# Patient Record
Sex: FEMALE | Race: WHITE | NOT HISPANIC OR LATINO
[De-identification: names, ages, dates, MRNs, and addresses within clinical notes are randomized per-mention and may not be internally consistent; named-entity substitution may affect disease eponyms.]

---

## 2021-05-17 ENCOUNTER — FORM ENCOUNTER (OUTPATIENT)
Age: 76
End: 2021-05-17

## 2021-05-17 VITALS
RESPIRATION RATE: 17 BRPM | TEMPERATURE: 98 F | DIASTOLIC BLOOD PRESSURE: 79 MMHG | WEIGHT: 145.06 LBS | SYSTOLIC BLOOD PRESSURE: 126 MMHG | HEART RATE: 113 BPM | OXYGEN SATURATION: 96 %

## 2021-05-17 LAB
ALBUMIN SERPL ELPH-MCNC: 3.9 G/DL — SIGNIFICANT CHANGE UP (ref 3.3–5)
ALP SERPL-CCNC: 74 U/L — SIGNIFICANT CHANGE UP (ref 40–120)
ALT FLD-CCNC: 55 U/L — HIGH (ref 10–45)
ANION GAP SERPL CALC-SCNC: 23 MMOL/L — HIGH (ref 5–17)
AST SERPL-CCNC: 96 U/L — HIGH (ref 10–40)
BASE EXCESS BLDV CALC-SCNC: -4.3 MMOL/L — LOW (ref -2–3)
BASOPHILS # BLD AUTO: 0.03 K/UL — SIGNIFICANT CHANGE UP (ref 0–0.2)
BASOPHILS NFR BLD AUTO: 0.2 % — SIGNIFICANT CHANGE UP (ref 0–2)
BILIRUB SERPL-MCNC: 0.6 MG/DL — SIGNIFICANT CHANGE UP (ref 0.2–1.2)
BUN SERPL-MCNC: 61 MG/DL — HIGH (ref 7–23)
CA-I SERPL-SCNC: 1.24 MMOL/L — SIGNIFICANT CHANGE UP (ref 1.15–1.33)
CALCIUM SERPL-MCNC: 9.8 MG/DL — SIGNIFICANT CHANGE UP (ref 8.4–10.5)
CHLORIDE SERPL-SCNC: 102 MMOL/L — SIGNIFICANT CHANGE UP (ref 96–108)
CK SERPL-CCNC: 4671 U/L — HIGH (ref 25–170)
CO2 BLDV-SCNC: 22.9 MMOL/L — SIGNIFICANT CHANGE UP (ref 22–26)
CO2 SERPL-SCNC: 18 MMOL/L — LOW (ref 22–31)
CREAT SERPL-MCNC: 1.21 MG/DL — SIGNIFICANT CHANGE UP (ref 0.5–1.3)
EOSINOPHIL # BLD AUTO: 0 K/UL — SIGNIFICANT CHANGE UP (ref 0–0.5)
EOSINOPHIL NFR BLD AUTO: 0 % — SIGNIFICANT CHANGE UP (ref 0–6)
GAS PNL BLDV: 140 MMOL/L — SIGNIFICANT CHANGE UP (ref 136–145)
GAS PNL BLDV: SIGNIFICANT CHANGE UP
GLUCOSE SERPL-MCNC: 203 MG/DL — HIGH (ref 70–99)
HCO3 BLDV-SCNC: 22 MMOL/L — SIGNIFICANT CHANGE UP (ref 22–29)
HCT VFR BLD CALC: 49.7 % — HIGH (ref 34.5–45)
HGB BLD-MCNC: 16.1 G/DL — HIGH (ref 11.5–15.5)
IMM GRANULOCYTES NFR BLD AUTO: 0.5 % — SIGNIFICANT CHANGE UP (ref 0–1.5)
LACTATE SERPL-SCNC: 1 MMOL/L — SIGNIFICANT CHANGE UP (ref 0.5–2)
LYMPHOCYTES # BLD AUTO: 1.21 K/UL — SIGNIFICANT CHANGE UP (ref 1–3.3)
LYMPHOCYTES # BLD AUTO: 8.3 % — LOW (ref 13–44)
MAGNESIUM SERPL-MCNC: 2.3 MG/DL — SIGNIFICANT CHANGE UP (ref 1.6–2.6)
MCHC RBC-ENTMCNC: 30.1 PG — SIGNIFICANT CHANGE UP (ref 27–34)
MCHC RBC-ENTMCNC: 32.4 GM/DL — SIGNIFICANT CHANGE UP (ref 32–36)
MCV RBC AUTO: 92.9 FL — SIGNIFICANT CHANGE UP (ref 80–100)
MONOCYTES # BLD AUTO: 1.4 K/UL — HIGH (ref 0–0.9)
MONOCYTES NFR BLD AUTO: 9.6 % — SIGNIFICANT CHANGE UP (ref 2–14)
NEUTROPHILS # BLD AUTO: 11.88 K/UL — HIGH (ref 1.8–7.4)
NEUTROPHILS NFR BLD AUTO: 81.4 % — HIGH (ref 43–77)
NRBC # BLD: 0 /100 WBCS — SIGNIFICANT CHANGE UP (ref 0–0)
PCO2 BLDV: 42 MMHG — SIGNIFICANT CHANGE UP (ref 39–42)
PH BLDV: 7.32 — SIGNIFICANT CHANGE UP (ref 7.32–7.43)
PLATELET # BLD AUTO: 308 K/UL — SIGNIFICANT CHANGE UP (ref 150–400)
PO2 BLDV: 43 MMHG — SIGNIFICANT CHANGE UP
POTASSIUM BLDV-SCNC: 4.7 MMOL/L — SIGNIFICANT CHANGE UP (ref 3.5–5.1)
POTASSIUM SERPL-MCNC: 4.6 MMOL/L — SIGNIFICANT CHANGE UP (ref 3.5–5.3)
POTASSIUM SERPL-SCNC: 4.6 MMOL/L — SIGNIFICANT CHANGE UP (ref 3.5–5.3)
PROT SERPL-MCNC: 7.7 G/DL — SIGNIFICANT CHANGE UP (ref 6–8.3)
RBC # BLD: 5.35 M/UL — HIGH (ref 3.8–5.2)
RBC # FLD: 13.2 % — SIGNIFICANT CHANGE UP (ref 10.3–14.5)
SAO2 % BLDV: 68.7 % — SIGNIFICANT CHANGE UP
SODIUM SERPL-SCNC: 143 MMOL/L — SIGNIFICANT CHANGE UP (ref 135–145)
WBC # BLD: 14.59 K/UL — HIGH (ref 3.8–10.5)
WBC # FLD AUTO: 14.59 K/UL — HIGH (ref 3.8–10.5)

## 2021-05-17 PROCEDURE — 72125 CT NECK SPINE W/O DYE: CPT | Mod: 26,MA

## 2021-05-17 PROCEDURE — 73590 X-RAY EXAM OF LOWER LEG: CPT | Mod: 26,LT

## 2021-05-17 PROCEDURE — 70450 CT HEAD/BRAIN W/O DYE: CPT | Mod: 26,MA

## 2021-05-17 PROCEDURE — 71260 CT THORAX DX C+: CPT | Mod: 26,MA

## 2021-05-17 PROCEDURE — 73030 X-RAY EXAM OF SHOULDER: CPT | Mod: 26

## 2021-05-17 PROCEDURE — 99285 EMERGENCY DEPT VISIT HI MDM: CPT | Mod: CS,25

## 2021-05-17 PROCEDURE — 73610 X-RAY EXAM OF ANKLE: CPT | Mod: 26,LT

## 2021-05-17 PROCEDURE — 74177 CT ABD & PELVIS W/CONTRAST: CPT | Mod: 26,MA

## 2021-05-17 PROCEDURE — 73030 X-RAY EXAM OF SHOULDER: CPT | Mod: 26,LT

## 2021-05-17 PROCEDURE — 93010 ELECTROCARDIOGRAM REPORT: CPT

## 2021-05-17 RX ORDER — SODIUM CHLORIDE 9 MG/ML
1000 INJECTION INTRAMUSCULAR; INTRAVENOUS; SUBCUTANEOUS ONCE
Refills: 0 | Status: COMPLETED | OUTPATIENT
Start: 2021-05-17 | End: 2021-05-17

## 2021-05-17 RX ADMIN — SODIUM CHLORIDE 1000 MILLILITER(S): 9 INJECTION INTRAMUSCULAR; INTRAVENOUS; SUBCUTANEOUS at 23:09

## 2021-05-17 RX ADMIN — SODIUM CHLORIDE 1000 MILLILITER(S): 9 INJECTION INTRAMUSCULAR; INTRAVENOUS; SUBCUTANEOUS at 21:57

## 2021-05-17 RX ADMIN — SODIUM CHLORIDE 1000 MILLILITER(S): 9 INJECTION INTRAMUSCULAR; INTRAVENOUS; SUBCUTANEOUS at 20:57

## 2021-05-17 NOTE — ED PROVIDER NOTE - CARE PLAN
Principal Discharge DX:	Syncope, unspecified syncope type  Secondary Diagnosis:	Non-traumatic rhabdomyolysis  Secondary Diagnosis:	Dehydration   Principal Discharge DX:	Syncope, unspecified syncope type  Secondary Diagnosis:	Non-traumatic rhabdomyolysis  Secondary Diagnosis:	Dehydration  Secondary Diagnosis:	Ketosis

## 2021-05-17 NOTE — ED ADULT NURSE NOTE - NSIMPLEMENTINTERV_GEN_ALL_ED
Implemented All Fall Risk Interventions:  [place] to call system. Call bell, personal items and telephone within reach. Instruct patient to call for assistance. Room bathroom lighting operational. Non-slip footwear when patient is off stretcher. Physically safe environment: no spills, clutter or unnecessary equipment. Stretcher in lowest position, wheels locked, appropriate side rails in place. Provide visual cue, wrist band, yellow gown, etc. Monitor gait and stability. Monitor for mental status changes and reorient to person, place, and time. Review medications for side effects contributing to fall risk. Reinforce activity limits and safety measures with patient and family.

## 2021-05-17 NOTE — ED ADULT TRIAGE NOTE - CHIEF COMPLAINT QUOTE
pt BIBA s/p fall about 3 days ago. as per son " last time he spoke to pt was Friday" pt endorsed going to the bathroom and then waking up on the floor. denies use of blood thinners. abrasions noted to both elbows.

## 2021-05-17 NOTE — ED PROVIDER NOTE - CLINICAL SUMMARY MEDICAL DECISION MAKING FREE TEXT BOX
Pt presents s/p fall vs syncope at home, unclear by history, on the floor for days, w/ likely rhabdomyolysis (given abrasions, ecchymoses), very dry clinically. There are no EKG changes to suggest ischemia, infarction, or pericarditis. DDx includes but not limited to syncope, mechanical fall, rhabdomyolysis, other toxic / metabolic / electrolyte disturbances, infection, injury, other pathology. Check imaging, labs, UA, IVF, r/o COVID. Anticipate admission

## 2021-05-17 NOTE — CONSULT NOTE ADULT - ATTENDING COMMENTS
El, Bridget [phone]  Ms Gallegos is a 75 y/o female with DM, HTN, hypothyroidism who was found down with incontinence but does not recall details as  to how she end up on the floor.    In the Ed her SBP 120mmhg, RR 14, SO2 95%, she was a/o x3, dehydrated, has elevated hg, bicarb 8, glu 343, AG 23, trop 0.13 ->0,1, B OH butyrate, elevate, urine with ketone.  She was evaluated with the resident, management decisions made, see above for the details, I agree with the A/P.  -Found Down  -DM with AG  -starvation ketosis  -elevated troponin  -rhabdomyolysis  -dehydration  See above for the details.

## 2021-05-17 NOTE — ED PROVIDER NOTE - PHYSICAL EXAMINATION
Constitutional: Well appearing, well nourished, awake, alert, oriented to person, place, time/situation and in no apparent distress.  Head atraumatic, normocephalic. No signs of trauma  ENMT: Airway patent. Very dry MM  Eyes: Clear bilaterally, PERRL, EOMI  Chest: no trauma anterior. 3 posterior thorax w/ abrasions, some ecchymoses R costophrenic angle / flank  Cardiac: Normal rate, regular rhythm.  Heart sounds S1, S2.  No murmurs, rubs or gallops.  Respiratory: Breaths sounds equal and clear b/l. No increased WOB, tachypnea, hypoxia, or accessory mm use. Pt speaks in full sentences.   Gastrointestinal: Abd soft, NT, ND, NABS. No guarding, rebound, or rigidity. No pulsatile abdominal masses. No organomegaly appreciated. no trauma  Musculoskeletal: Pelvis stable. Range of motion is not limited in b/l hips. FROM all joints x 4 ext w/o dec ROM, or pain. L ankle swelling, L shoulder ecchymosis, R hand ecchymosis dorsally. no midline spinal ttp throughout the spine. no cervical spinal ttp. FROM neck w/o midline pain  Neuro: Alert and oriented x 3, face symmetric. Strength 5/5 x 4 ext and symmetric, nml gross motor movement, nml gait. No focal deficits noted.   Skin: Skin normal color for race, warm, dry. some erythema and mild skin breakdown inferior to abdominal pannus, in the intertriginous area, likely 2/2 skin rubbing / fungal. multiple abrasions and superficial skin breakdown to b/l knees, ecchymosis to proximal L tib / fib.   Psych: Alert and oriented to person, place, time/situation. normal mood and affect.

## 2021-05-17 NOTE — ED ADULT NURSE NOTE - OBJECTIVE STATEMENT
Pt arrives s/p fall. Pt states she does not know how she fell or when she fell but remembers waking up on the bathroom ground. Pt son reports he called pt on Friday around 1430 and found her Saturday newspaper outside her door today (Monday). Unknown how long pt was on floor. Pt reports dizziness and weakness. Pt reports she uses a cane to walk. Pt Aox4, denies HA, no vision changes. Pt denies any pain. Pt has abrasions to bilateral elbows and knees and bruise to L shoulder. Pt able to move all extremities, no numbness/tingling, no swelling, no bleeding, no lacerations noted/hematomas. Pt arrives, strong odor of urine noted, pt incontinent of urine, pt changed into clean gown and sheets.

## 2021-05-17 NOTE — ED PROVIDER NOTE - OBJECTIVE STATEMENT
Pt w/ PMHx NIDDM, HTN, HLD, hypothyroidism, no sig PSHx, presents s/p being found at home, on the ground, in the bathroom. Pt reports she went to the bathroom, and ended up on the floor. Pt does not known what happened. Pt was last spoken to on Friday (4 days ago). Pt was unable to be reached yesterday, therefore a building staff member went into the apartment today, finding her on the floor. Pt denies CP, SOB, f/c, URI, GI,  sx. Pt was incontinent of urine. Pt denies specific complaints. Pt lives alone, normally ambulates on her own, performs all ADLs, and only has help at home in terms of housekeeping once weekly. Pt is accompanied in the ED by her nephew, whom lives in [place], who met the pt in the ED. He did not see the pt in her home. No AC use. Denies hx cardiac disease. Pt had COVID19 vaccine.

## 2021-05-17 NOTE — ED PROVIDER NOTE - SHIFT CHANGE DETAILS
Rhabdo, dehydration, fall vs syncope, starvation ketosis vs dka, pending MICU consult for admission. no acute injury on imaging

## 2021-05-17 NOTE — CONSULT NOTE ADULT - SUBJECTIVE AND OBJECTIVE BOX
ICU CONSULT NOTE***INCOMPLETE    Patient is a 76y old  Female who presents with a chief complaint of     76yFemale    PMHx -   PSx -   Meds -   Allergies -   FHx -   Sx -     PHYSICAL EXAM   Vital Signs Last 24 Hrs  T(C): 36.8 (17 May 2021 22:40), Max: 36.8 (17 May 2021 19:56)  T(F): 98.3 (17 May 2021 22:40), Max: 98.3 (17 May 2021 22:40)  HR: 98 (17 May 2021 22:40) (98 - 113)  BP: 136/98 (17 May 2021 22:40) (126/79 - 136/98)  BP(mean): --  RR: 17 (17 May 2021 22:40) (17 - 17)  SpO2: 95% (17 May 2021 22:40) (95% - 96%)    General -   HEENT -   CV -   Resp -   Abdomen -   Extremities -   Skin -       LABS                        16.1   14.59 )-----------( 308      ( 17 May 2021 20:26 )             49.7     05-17    143  |  102  |  61<H>  ----------------------------<  203<H>  4.6   |  18<L>  |  1.21    Ca    9.8      17 May 2021 20:26  Mg     2.3     05-17    TPro  7.7  /  Alb  3.9  /  TBili  0.6  /  DBili  x   /  AST  96<H>  /  ALT  55<H>  /  AlkPhos  74  05-17      Lactate, Blood: 1.0 mmol/L (05-17-21 @ 23:12)        IMAGING   CXR -   EKG -  ICU CONSULT NOTE    This is a 76F with PMHx of T2DM, HTN, HLD, hypothyroidism who presents after being found down at home in her bathroom. Patient has difficulty recalling the events leading up to her fall.    76yFemale    PMHx -   PSx -   Meds -   Allergies -   FHx -   Sx -     PHYSICAL EXAM   Vital Signs Last 24 Hrs  T(C): 36.8 (17 May 2021 22:40), Max: 36.8 (17 May 2021 19:56)  T(F): 98.3 (17 May 2021 22:40), Max: 98.3 (17 May 2021 22:40)  HR: 98 (17 May 2021 22:40) (98 - 113)  BP: 136/98 (17 May 2021 22:40) (126/79 - 136/98)  BP(mean): --  RR: 17 (17 May 2021 22:40) (17 - 17)  SpO2: 95% (17 May 2021 22:40) (95% - 96%)    General -   HEENT -   CV -   Resp -   Abdomen -   Extremities -   Skin -       LABS                        16.1   14.59 )-----------( 308      ( 17 May 2021 20:26 )             49.7     05-17    143  |  102  |  61<H>  ----------------------------<  203<H>  4.6   |  18<L>  |  1.21    Ca    9.8      17 May 2021 20:26  Mg     2.3     05-17    TPro  7.7  /  Alb  3.9  /  TBili  0.6  /  DBili  x   /  AST  96<H>  /  ALT  55<H>  /  AlkPhos  74  05-17      Lactate, Blood: 1.0 mmol/L (05-17-21 @ 23:12)        IMAGING   CXR -   EKG -  ICU CONSULT NOTE    This is a 76F with PMHx of T2DM, HTN, HLD, hypothyroidism who presents after being found down at home in her bathroom. Patient has difficulty recalling the events leading up to her fall. Patient was last known normal 4 days prior to presentation. She was found on floor of bathroom with incontinence and was brought in to the hospital. At baseline, patient is able to perform ADLs but nephew reports that patient is noncompliant with her home medications. In ED, patient was    76yFemale    PMHx -   PSx -   Meds -   Allergies -   FHx -   Sx -     PHYSICAL EXAM   Vital Signs Last 24 Hrs  T(C): 36.8 (17 May 2021 22:40), Max: 36.8 (17 May 2021 19:56)  T(F): 98.3 (17 May 2021 22:40), Max: 98.3 (17 May 2021 22:40)  HR: 98 (17 May 2021 22:40) (98 - 113)  BP: 136/98 (17 May 2021 22:40) (126/79 - 136/98)  BP(mean): --  RR: 17 (17 May 2021 22:40) (17 - 17)  SpO2: 95% (17 May 2021 22:40) (95% - 96%)    General -   HEENT -   CV -   Resp -   Abdomen -   Extremities -   Skin -       LABS                        16.1   14.59 )-----------( 308      ( 17 May 2021 20:26 )             49.7     05-17    143  |  102  |  61<H>  ----------------------------<  203<H>  4.6   |  18<L>  |  1.21    Ca    9.8      17 May 2021 20:26  Mg     2.3     05-17    TPro  7.7  /  Alb  3.9  /  TBili  0.6  /  DBili  x   /  AST  96<H>  /  ALT  55<H>  /  AlkPhos  74  05-17      Lactate, Blood: 1.0 mmol/L (05-17-21 @ 23:12)        IMAGING   CXR -   EKG -  ICU CONSULT NOTE    This is a 76F with PMHx of T2DM, HTN, HLD, hypothyroidism who presents after being found down at home in her bathroom. Patient has difficulty recalling the events leading up to her fall. Patient was last known normal 4 days prior to presentation. She was found on floor of bathroom with incontinence and was brought in to the hospital. At baseline, patient is able to perform ADLs but nephew reports that patient is noncompliant with her home medications. In ED, patient was tachycardic with anion gap metabolic acidosis likely from starvation ketosis. Patient underwent pan-scan which was relatively unremarkable for significant acute pathologies. Patient received IV NS.     PAST MEDICAL & SURGICAL HISTORY:  Diabetes mellitus  Hypertension  Hypothyroidism    Home Medications:  Glimepiride 2mg PO daily  Invokana 300mg daily  Quinapril 40mg daily  Synthroid 125mcg daily  Victoza 1.8mg injectable sq daily  metformin 1g BID  Simvastatin 40mg daily    Allergies: N  FHx -   Sx -     PHYSICAL EXAM   Vital Signs Last 24 Hrs  T(C): 36.8 (17 May 2021 22:40), Max: 36.8 (17 May 2021 19:56)  T(F): 98.3 (17 May 2021 22:40), Max: 98.3 (17 May 2021 22:40)  HR: 98 (17 May 2021 22:40) (98 - 113)  BP: 136/98 (17 May 2021 22:40) (126/79 - 136/98)  BP(mean): --  RR: 17 (17 May 2021 22:40) (17 - 17)  SpO2: 95% (17 May 2021 22:40) (95% - 96%)    General -   HEENT -   CV -   Resp -   Abdomen -   Extremities -   Skin -       LABS                        16.1   14.59 )-----------( 308      ( 17 May 2021 20:26 )             49.7     05-17    143  |  102  |  61<H>  ----------------------------<  203<H>  4.6   |  18<L>  |  1.21    Ca    9.8      17 May 2021 20:26  Mg     2.3     05-17    TPro  7.7  /  Alb  3.9  /  TBili  0.6  /  DBili  x   /  AST  96<H>  /  ALT  55<H>  /  AlkPhos  74  05-17      Lactate, Blood: 1.0 mmol/L (05-17-21 @ 23:12)        IMAGING   CXR -   EKG -  ICU CONSULT NOTE    This is a 76F with PMHx of T2DM, HTN, HLD, hypothyroidism who presents after being found down at home in her bathroom. Patient has difficulty recalling the events leading up to her fall. Patient was last known normal 4 days prior to presentation. She was found on floor of bathroom with incontinence and was brought in to the hospital. At baseline, patient is able to perform ADLs but nephew reports that patient is noncompliant with her home medications. In ED, patient was tachycardic with anion gap metabolic acidosis likely from starvation ketosis. Patient underwent pan-scan which was relatively unremarkable for significant acute pathologies. Patient received IV NS.     PAST MEDICAL & SURGICAL HISTORY:  Diabetes mellitus  Hypertension  Hypothyroidism    Home Medications:  Glimepiride 2mg PO daily  Invokana 300mg daily  Quinapril 40mg daily  Synthroid 125mcg daily  Victoza 1.8mg injectable sq daily  metformin 1g BID  Simvastatin 40mg daily    Allergies: NKDA    Family: Noncontributory    Social: Lives alone, etoh denies, former PPD smoker     PHYSICAL EXAM   Vital Signs Last 24 Hrs  T(C): 36.8 (17 May 2021 22:40), Max: 36.8 (17 May 2021 19:56)  T(F): 98.3 (17 May 2021 22:40), Max: 98.3 (17 May 2021 22:40)  HR: 98 (17 May 2021 22:40) (98 - 113)  BP: 136/98 (17 May 2021 22:40) (126/79 - 136/98)  BP(mean): --  RR: 17 (17 May 2021 22:40) (17 - 17)  SpO2: 95% (17 May 2021 22:40) (95% - 96%)    General -   HEENT -   CV -   Resp -   Abdomen -   Extremities -   Skin -       LABS                        16.1   14.59 )-----------( 308      ( 17 May 2021 20:26 )             49.7     05-17    143  |  102  |  61<H>  ----------------------------<  203<H>  4.6   |  18<L>  |  1.21    Ca    9.8      17 May 2021 20:26  Mg     2.3     05-17    TPro  7.7  /  Alb  3.9  /  TBili  0.6  /  DBili  x   /  AST  96<H>  /  ALT  55<H>  /  AlkPhos  74  05-17      Lactate, Blood: 1.0 mmol/L (05-17-21 @ 23:12)        IMAGING   CXR -   EKG -  ICU CONSULT NOTE    This is a 76F with PMHx of T2DM, HTN, HLD, hypothyroidism who presents after being found down at home in her bathroom. Patient has difficulty recalling the events leading up to her fall. Patient was last known normal 4 days prior to presentation. She was found on floor of bathroom with incontinence and was brought in to the hospital. At baseline, patient is able to perform ADLs but nephew reports that patient is noncompliant with her home medications. In ED, patient was tachycardic with anion gap metabolic acidosis likely from starvation ketosis and rhabdomyolysis. Patient underwent pan-scan which was relatively unremarkable for significant acute pathologies. Patient received IV NS.     PAST MEDICAL & SURGICAL HISTORY:  Diabetes mellitus  Hypertension  Hypothyroidism    Home Medications:  Glimepiride 2mg PO daily  Invokana 300mg daily  Quinapril 40mg daily  Synthroid 125mcg daily  Victoza 1.8mg injectable sq daily  metformin 1g BID  Simvastatin 40mg daily    Allergies: NKDA    Family: Noncontributory    Social: Lives alone, etoh denies, former PPD smoker     PHYSICAL EXAM   Vital Signs Last 24 Hrs  T(C): 36.8 (17 May 2021 22:40), Max: 36.8 (17 May 2021 19:56)  T(F): 98.3 (17 May 2021 22:40), Max: 98.3 (17 May 2021 22:40)  HR: 98 (17 May 2021 22:40) (98 - 113)  BP: 136/98 (17 May 2021 22:40) (126/79 - 136/98)  BP(mean): --  RR: 17 (17 May 2021 22:40) (17 - 17)  SpO2: 95% (17 May 2021 22:40) (95% - 96%)    PHYSICAL EXAM:    Constitutional: WDWN resting comfortably in bed; NAD  Head: NC/AT  Eyes: PERRL, EOMI, clear conjunctiva  ENT: no nasal discharge; uvula midline, no oropharyngeal erythema or exudates; MMM  Neck: supple; dry mucus membranes  Respiratory: CTA B/L; no W/R/r  Cardiac: +S1/S2; RRR; no M/R/G  Gastrointestinal: soft, NT/ND; suprapubic tenderness; no rebound or guarding; +BSx4  Back: spine midline, no bony tenderness or step-offs  Extremities: WWP, no clubbing or cyanosis; no peripheral edema  Musculoskeletal: NROM x4; no joint swelling, tenderness or erythema  Vascular: 2+ radial and pedal pulses b/l  Dermatologic: skin warm, dry and intact; no rashes, wounds, or scars  Neurologic: AAOx3; moves all extremities    LABS                        16.1   14.59 )-----------( 308      ( 17 May 2021 20:26 )             49.7     05-17    143  |  102  |  61<H>  ----------------------------<  203<H>  4.6   |  18<L>  |  1.21    Ca    9.8      17 May 2021 20:26  Mg     2.3     05-17    TPro  7.7  /  Alb  3.9  /  TBili  0.6  /  DBili  x   /  AST  96<H>  /  ALT  55<H>  /  AlkPhos  74  05-17    Lactate, Blood: 1.0 mmol/L (05-17-21 @ 23:12)    IMAGING   CXR - Clear  EKG - Sinus tachycardia with PVC

## 2021-05-17 NOTE — CONSULT NOTE ADULT - ASSESSMENT
This is a 76F with PMHx of T2DM, HTN, HLD, hypothyroidism who presents after being found down at home in her bathroom. Patient has difficulty recalling the events leading up to her fall. Patient was last known normal 4 days prior to presentation. ICU consulted for metabolic derangements and syncope.    #Fall  - found down unclear etiology of fall. Most likely vasovagal given metabolic derangements and patient was in bathroom, however r/o seizure, stroke, syncope  - CT head normal  - EKG nonspecific R wave changes  - Consider EEG  - CT pan scan relatively unremarkable  - No evidence of infection, check procalcitonin  - check TSH  - Treatment of metabolic derangements as below    #Rhabdomyolysis  - CK elevated to 4k on admission in setting being found down for unknown period of time  - s/p 2L NS, give additional 1L LR  - Continue 150cc/hr LR for 10 hours    #Anion gap metabolic acidosis 2/2 starvation ketosis  - In setting of being found down  - No acidosis to suggest other cause of AGMA (lactate normal, no acidosis to suggest DKA)  - Fluids as above  - Give thiamine high dose 500 IV x3 days    #Dehydration  - s/p 2L NS in ED  - Give another 1L LR    #T2DM  - SSI, accuchecks  - Hyperglycemic on admission without acidosis    #CARLOS  - Cr elevated to 1.2 on admission baseline normal likely  - Trend Cr after fluids    Dispo: No role for medicine tele or ICU. Plan discussed with attending Dr. Gastelum.

## 2021-05-17 NOTE — ED PROVIDER NOTE - NS ED ROS FT
Constitutional: No fever or chills.   Eyes: No pain, blurry vision, or discharge.  ENMT: No hearing changes, pain, discharge or infections. No neck pain or stiffness.  Cardiac: No chest pain, SOB or edema. No chest pain with exertion.  Respiratory: No cough or respiratory distress. No hemoptysis. No history of asthma or RAD.  GI: No nausea, vomiting, diarrhea or abdominal pain.  : No dysuria, frequency or burning.  MS: No myalgia, muscle weakness, joint pain or back pain.  Neuro: No headache or weakness. No LOC.  Skin: No skin rash.   Endocrine: see HPI  Except as documented in the HPI, all other systems are negative.

## 2021-05-18 ENCOUNTER — INPATIENT (INPATIENT)
Facility: HOSPITAL | Age: 76
LOS: 2 days | Discharge: HOME HEALTH | DRG: 261 | End: 2021-05-21
Attending: INTERNAL MEDICINE | Admitting: INTERNAL MEDICINE
Payer: MEDICARE

## 2021-05-18 DIAGNOSIS — M62.82 RHABDOMYOLYSIS: ICD-10-CM

## 2021-05-18 DIAGNOSIS — E86.0 DEHYDRATION: ICD-10-CM

## 2021-05-18 DIAGNOSIS — I10 ESSENTIAL (PRIMARY) HYPERTENSION: ICD-10-CM

## 2021-05-18 DIAGNOSIS — E03.9 HYPOTHYROIDISM, UNSPECIFIED: ICD-10-CM

## 2021-05-18 DIAGNOSIS — E11.9 TYPE 2 DIABETES MELLITUS WITHOUT COMPLICATIONS: ICD-10-CM

## 2021-05-18 DIAGNOSIS — R56.9 UNSPECIFIED CONVULSIONS: ICD-10-CM

## 2021-05-18 DIAGNOSIS — R55 SYNCOPE AND COLLAPSE: ICD-10-CM

## 2021-05-18 DIAGNOSIS — E78.5 HYPERLIPIDEMIA, UNSPECIFIED: ICD-10-CM

## 2021-05-18 LAB
A1C WITH ESTIMATED AVERAGE GLUCOSE RESULT: 6 % — HIGH (ref 4–5.6)
ALBUMIN SERPL ELPH-MCNC: 3.1 G/DL — LOW (ref 3.3–5)
ALBUMIN SERPL ELPH-MCNC: 3.4 G/DL — SIGNIFICANT CHANGE UP (ref 3.3–5)
ALP SERPL-CCNC: 59 U/L — SIGNIFICANT CHANGE UP (ref 40–120)
ALP SERPL-CCNC: 66 U/L — SIGNIFICANT CHANGE UP (ref 40–120)
ALT FLD-CCNC: 46 U/L — HIGH (ref 10–45)
ALT FLD-CCNC: 48 U/L — HIGH (ref 10–45)
ANION GAP SERPL CALC-SCNC: 11 MMOL/L — SIGNIFICANT CHANGE UP (ref 5–17)
ANION GAP SERPL CALC-SCNC: 16 MMOL/L — SIGNIFICANT CHANGE UP (ref 5–17)
ANION GAP SERPL CALC-SCNC: 18 MMOL/L — HIGH (ref 5–17)
APPEARANCE UR: CLEAR — SIGNIFICANT CHANGE UP
APTT BLD: 26 SEC — LOW (ref 27.5–35.5)
AST SERPL-CCNC: 69 U/L — HIGH (ref 10–40)
AST SERPL-CCNC: 85 U/L — HIGH (ref 10–40)
B-OH-BUTYR SERPL-SCNC: 3.6 MMOL/L — HIGH
BACTERIA # UR AUTO: PRESENT /HPF
BASOPHILS # BLD AUTO: 0.03 K/UL — SIGNIFICANT CHANGE UP (ref 0–0.2)
BASOPHILS NFR BLD AUTO: 0.3 % — SIGNIFICANT CHANGE UP (ref 0–2)
BILIRUB SERPL-MCNC: 0.4 MG/DL — SIGNIFICANT CHANGE UP (ref 0.2–1.2)
BILIRUB SERPL-MCNC: 0.5 MG/DL — SIGNIFICANT CHANGE UP (ref 0.2–1.2)
BILIRUB UR-MCNC: ABNORMAL
BUN SERPL-MCNC: 41 MG/DL — HIGH (ref 7–23)
BUN SERPL-MCNC: 49 MG/DL — HIGH (ref 7–23)
BUN SERPL-MCNC: 55 MG/DL — HIGH (ref 7–23)
CALCIUM SERPL-MCNC: 8.8 MG/DL — SIGNIFICANT CHANGE UP (ref 8.4–10.5)
CALCIUM SERPL-MCNC: 8.9 MG/DL — SIGNIFICANT CHANGE UP (ref 8.4–10.5)
CALCIUM SERPL-MCNC: 9.1 MG/DL — SIGNIFICANT CHANGE UP (ref 8.4–10.5)
CHLORIDE SERPL-SCNC: 105 MMOL/L — SIGNIFICANT CHANGE UP (ref 96–108)
CHLORIDE SERPL-SCNC: 109 MMOL/L — HIGH (ref 96–108)
CHLORIDE SERPL-SCNC: 109 MMOL/L — HIGH (ref 96–108)
CHOLEST SERPL-MCNC: 160 MG/DL — SIGNIFICANT CHANGE UP
CK MB CFR SERPL CALC: 13.1 NG/ML — HIGH (ref 0–6.7)
CK MB CFR SERPL CALC: 18.5 NG/ML — HIGH (ref 0–6.7)
CK SERPL-CCNC: 2851 U/L — HIGH (ref 25–170)
CK SERPL-CCNC: 3787 U/L — HIGH (ref 25–170)
CO2 SERPL-SCNC: 18 MMOL/L — LOW (ref 22–31)
CO2 SERPL-SCNC: 20 MMOL/L — LOW (ref 22–31)
CO2 SERPL-SCNC: 24 MMOL/L — SIGNIFICANT CHANGE UP (ref 22–31)
COLOR SPEC: YELLOW — SIGNIFICANT CHANGE UP
CREAT SERPL-MCNC: 0.83 MG/DL — SIGNIFICANT CHANGE UP (ref 0.5–1.3)
CREAT SERPL-MCNC: 0.9 MG/DL — SIGNIFICANT CHANGE UP (ref 0.5–1.3)
CREAT SERPL-MCNC: 1.03 MG/DL — SIGNIFICANT CHANGE UP (ref 0.5–1.3)
D DIMER BLD IA.RAPID-MCNC: 362 NG/ML DDU — HIGH
DIFF PNL FLD: ABNORMAL
EOSINOPHIL # BLD AUTO: 0.01 K/UL — SIGNIFICANT CHANGE UP (ref 0–0.5)
EOSINOPHIL NFR BLD AUTO: 0.1 % — SIGNIFICANT CHANGE UP (ref 0–6)
EPI CELLS # UR: SIGNIFICANT CHANGE UP /HPF (ref 0–5)
ESTIMATED AVERAGE GLUCOSE: 126 MG/DL — HIGH (ref 68–114)
GLUCOSE BLDC GLUCOMTR-MCNC: 150 MG/DL — HIGH (ref 70–99)
GLUCOSE BLDC GLUCOMTR-MCNC: 161 MG/DL — HIGH (ref 70–99)
GLUCOSE BLDC GLUCOMTR-MCNC: 183 MG/DL — HIGH (ref 70–99)
GLUCOSE BLDC GLUCOMTR-MCNC: 242 MG/DL — HIGH (ref 70–99)
GLUCOSE SERPL-MCNC: 146 MG/DL — HIGH (ref 70–99)
GLUCOSE SERPL-MCNC: 154 MG/DL — HIGH (ref 70–99)
GLUCOSE SERPL-MCNC: 202 MG/DL — HIGH (ref 70–99)
GLUCOSE UR QL: 500
HCT VFR BLD CALC: 44.1 % — SIGNIFICANT CHANGE UP (ref 34.5–45)
HDLC SERPL-MCNC: 41 MG/DL — LOW
HGB BLD-MCNC: 14.5 G/DL — SIGNIFICANT CHANGE UP (ref 11.5–15.5)
IMM GRANULOCYTES NFR BLD AUTO: 0.3 % — SIGNIFICANT CHANGE UP (ref 0–1.5)
KETONES UR-MCNC: 40 MG/DL
LEUKOCYTE ESTERASE UR-ACNC: NEGATIVE — SIGNIFICANT CHANGE UP
LIPID PNL WITH DIRECT LDL SERPL: 87 MG/DL — SIGNIFICANT CHANGE UP
LYMPHOCYTES # BLD AUTO: 1.49 K/UL — SIGNIFICANT CHANGE UP (ref 1–3.3)
LYMPHOCYTES # BLD AUTO: 12.7 % — LOW (ref 13–44)
MAGNESIUM SERPL-MCNC: 2.2 MG/DL — SIGNIFICANT CHANGE UP (ref 1.6–2.6)
MCHC RBC-ENTMCNC: 30.9 PG — SIGNIFICANT CHANGE UP (ref 27–34)
MCHC RBC-ENTMCNC: 32.9 GM/DL — SIGNIFICANT CHANGE UP (ref 32–36)
MCV RBC AUTO: 93.8 FL — SIGNIFICANT CHANGE UP (ref 80–100)
MONOCYTES # BLD AUTO: 1.14 K/UL — HIGH (ref 0–0.9)
MONOCYTES NFR BLD AUTO: 9.7 % — SIGNIFICANT CHANGE UP (ref 2–14)
NEUTROPHILS # BLD AUTO: 8.99 K/UL — HIGH (ref 1.8–7.4)
NEUTROPHILS NFR BLD AUTO: 76.9 % — SIGNIFICANT CHANGE UP (ref 43–77)
NITRITE UR-MCNC: NEGATIVE — SIGNIFICANT CHANGE UP
NON HDL CHOLESTEROL: 119 MG/DL — SIGNIFICANT CHANGE UP
NRBC # BLD: 0 /100 WBCS — SIGNIFICANT CHANGE UP (ref 0–0)
NT-PROBNP SERPL-SCNC: 2937 PG/ML — HIGH (ref 0–300)
PH UR: 5.5 — SIGNIFICANT CHANGE UP (ref 5–8)
PHOSPHATE SERPL-MCNC: 3.1 MG/DL — SIGNIFICANT CHANGE UP (ref 2.5–4.5)
PLATELET # BLD AUTO: 238 K/UL — SIGNIFICANT CHANGE UP (ref 150–400)
POTASSIUM SERPL-MCNC: 3.7 MMOL/L — SIGNIFICANT CHANGE UP (ref 3.5–5.3)
POTASSIUM SERPL-MCNC: 4 MMOL/L — SIGNIFICANT CHANGE UP (ref 3.5–5.3)
POTASSIUM SERPL-MCNC: 4.3 MMOL/L — SIGNIFICANT CHANGE UP (ref 3.5–5.3)
POTASSIUM SERPL-SCNC: 3.7 MMOL/L — SIGNIFICANT CHANGE UP (ref 3.5–5.3)
POTASSIUM SERPL-SCNC: 4 MMOL/L — SIGNIFICANT CHANGE UP (ref 3.5–5.3)
POTASSIUM SERPL-SCNC: 4.3 MMOL/L — SIGNIFICANT CHANGE UP (ref 3.5–5.3)
PROCALCITONIN SERPL-MCNC: 0.32 NG/ML — HIGH (ref 0.02–0.1)
PROT SERPL-MCNC: 6.4 G/DL — SIGNIFICANT CHANGE UP (ref 6–8.3)
PROT SERPL-MCNC: 6.7 G/DL — SIGNIFICANT CHANGE UP (ref 6–8.3)
PROT UR-MCNC: 30 MG/DL
RBC # BLD: 4.7 M/UL — SIGNIFICANT CHANGE UP (ref 3.8–5.2)
RBC # FLD: 13.3 % — SIGNIFICANT CHANGE UP (ref 10.3–14.5)
RBC CASTS # UR COMP ASSIST: < 5 /HPF — SIGNIFICANT CHANGE UP
SARS-COV-2 RNA SPEC QL NAA+PROBE: SIGNIFICANT CHANGE UP
SODIUM SERPL-SCNC: 143 MMOL/L — SIGNIFICANT CHANGE UP (ref 135–145)
SODIUM SERPL-SCNC: 143 MMOL/L — SIGNIFICANT CHANGE UP (ref 135–145)
SODIUM SERPL-SCNC: 144 MMOL/L — SIGNIFICANT CHANGE UP (ref 135–145)
SP GR SPEC: >=1.03 — SIGNIFICANT CHANGE UP (ref 1–1.03)
T4 AB SER-ACNC: 6.38 UG/DL — SIGNIFICANT CHANGE UP (ref 4.5–11.7)
TRIGL SERPL-MCNC: 159 MG/DL — HIGH
TROPONIN T SERPL-MCNC: 0.09 NG/ML — CRITICAL HIGH (ref 0–0.01)
TROPONIN T SERPL-MCNC: 0.1 NG/ML — CRITICAL HIGH (ref 0–0.01)
TSH SERPL-MCNC: 0.38 UIU/ML — SIGNIFICANT CHANGE UP (ref 0.27–4.2)
URATE SERPL-MCNC: 11.1 MG/DL — HIGH (ref 2.5–7)
UROBILINOGEN FLD QL: 0.2 E.U./DL — SIGNIFICANT CHANGE UP
WBC # BLD: 11.7 K/UL — HIGH (ref 3.8–10.5)
WBC # FLD AUTO: 11.7 K/UL — HIGH (ref 3.8–10.5)
WBC UR QL: < 5 /HPF — SIGNIFICANT CHANGE UP

## 2021-05-18 PROCEDURE — 71045 X-RAY EXAM CHEST 1 VIEW: CPT | Mod: 26

## 2021-05-18 PROCEDURE — 99222 1ST HOSP IP/OBS MODERATE 55: CPT

## 2021-05-18 PROCEDURE — 99223 1ST HOSP IP/OBS HIGH 75: CPT | Mod: GC

## 2021-05-18 PROCEDURE — 93306 TTE W/DOPPLER COMPLETE: CPT | Mod: 26

## 2021-05-18 PROCEDURE — 36000 PLACE NEEDLE IN VEIN: CPT

## 2021-05-18 PROCEDURE — 76937 US GUIDE VASCULAR ACCESS: CPT | Mod: 26

## 2021-05-18 RX ORDER — GLUCAGON INJECTION, SOLUTION 0.5 MG/.1ML
1 INJECTION, SOLUTION SUBCUTANEOUS ONCE
Refills: 0 | Status: DISCONTINUED | OUTPATIENT
Start: 2021-05-18 | End: 2021-05-21

## 2021-05-18 RX ORDER — SODIUM CHLORIDE 9 MG/ML
1000 INJECTION, SOLUTION INTRAVENOUS
Refills: 0 | Status: DISCONTINUED | OUTPATIENT
Start: 2021-05-18 | End: 2021-05-18

## 2021-05-18 RX ORDER — ENOXAPARIN SODIUM 100 MG/ML
40 INJECTION SUBCUTANEOUS EVERY 24 HOURS
Refills: 0 | Status: DISCONTINUED | OUTPATIENT
Start: 2021-05-18 | End: 2021-05-21

## 2021-05-18 RX ORDER — SODIUM CHLORIDE 9 MG/ML
1000 INJECTION, SOLUTION INTRAVENOUS
Refills: 0 | Status: DISCONTINUED | OUTPATIENT
Start: 2021-05-18 | End: 2021-05-21

## 2021-05-18 RX ORDER — NYSTATIN CREAM 100000 [USP'U]/G
1 CREAM TOPICAL
Refills: 0 | Status: DISCONTINUED | OUTPATIENT
Start: 2021-05-18 | End: 2021-05-21

## 2021-05-18 RX ORDER — FUROSEMIDE 40 MG
20 TABLET ORAL ONCE
Refills: 0 | Status: COMPLETED | OUTPATIENT
Start: 2021-05-18 | End: 2021-05-18

## 2021-05-18 RX ORDER — LEVOTHYROXINE SODIUM 125 MCG
125 TABLET ORAL DAILY
Refills: 0 | Status: DISCONTINUED | OUTPATIENT
Start: 2021-05-18 | End: 2021-05-21

## 2021-05-18 RX ORDER — SODIUM CHLORIDE 9 MG/ML
500 INJECTION INTRAMUSCULAR; INTRAVENOUS; SUBCUTANEOUS
Refills: 0 | Status: DISCONTINUED | OUTPATIENT
Start: 2021-05-18 | End: 2021-05-18

## 2021-05-18 RX ORDER — DEXTROSE 50 % IN WATER 50 %
25 SYRINGE (ML) INTRAVENOUS ONCE
Refills: 0 | Status: DISCONTINUED | OUTPATIENT
Start: 2021-05-18 | End: 2021-05-21

## 2021-05-18 RX ORDER — DEXTROSE 50 % IN WATER 50 %
15 SYRINGE (ML) INTRAVENOUS ONCE
Refills: 0 | Status: DISCONTINUED | OUTPATIENT
Start: 2021-05-18 | End: 2021-05-21

## 2021-05-18 RX ORDER — DEXTROSE 50 % IN WATER 50 %
12.5 SYRINGE (ML) INTRAVENOUS ONCE
Refills: 0 | Status: DISCONTINUED | OUTPATIENT
Start: 2021-05-18 | End: 2021-05-21

## 2021-05-18 RX ORDER — SIMVASTATIN 20 MG/1
40 TABLET, FILM COATED ORAL AT BEDTIME
Refills: 0 | Status: DISCONTINUED | OUTPATIENT
Start: 2021-05-18 | End: 2021-05-21

## 2021-05-18 RX ORDER — INSULIN LISPRO 100/ML
VIAL (ML) SUBCUTANEOUS
Refills: 0 | Status: DISCONTINUED | OUTPATIENT
Start: 2021-05-18 | End: 2021-05-21

## 2021-05-18 RX ORDER — AMLODIPINE BESYLATE 2.5 MG/1
5 TABLET ORAL DAILY
Refills: 0 | Status: DISCONTINUED | OUTPATIENT
Start: 2021-05-18 | End: 2021-05-19

## 2021-05-18 RX ADMIN — Medication 4: at 17:02

## 2021-05-18 RX ADMIN — NYSTATIN CREAM 1 APPLICATION(S): 100000 CREAM TOPICAL at 12:06

## 2021-05-18 RX ADMIN — SIMVASTATIN 40 MILLIGRAM(S): 20 TABLET, FILM COATED ORAL at 22:00

## 2021-05-18 RX ADMIN — Medication 2: at 12:21

## 2021-05-18 RX ADMIN — SODIUM CHLORIDE 1000 MILLILITER(S): 9 INJECTION INTRAMUSCULAR; INTRAVENOUS; SUBCUTANEOUS at 00:10

## 2021-05-18 RX ADMIN — NYSTATIN CREAM 1 APPLICATION(S): 100000 CREAM TOPICAL at 22:00

## 2021-05-18 RX ADMIN — SODIUM CHLORIDE 75 MILLILITER(S): 9 INJECTION INTRAMUSCULAR; INTRAVENOUS; SUBCUTANEOUS at 11:58

## 2021-05-18 RX ADMIN — Medication 20 MILLIGRAM(S): at 20:09

## 2021-05-18 RX ADMIN — Medication 2: at 22:00

## 2021-05-18 RX ADMIN — ENOXAPARIN SODIUM 40 MILLIGRAM(S): 100 INJECTION SUBCUTANEOUS at 22:00

## 2021-05-18 RX ADMIN — Medication 125 MICROGRAM(S): at 06:23

## 2021-05-18 RX ADMIN — AMLODIPINE BESYLATE 5 MILLIGRAM(S): 2.5 TABLET ORAL at 22:00

## 2021-05-18 NOTE — CONSULT NOTE ADULT - ASSESSMENT
76F former smoker (quit > 20 years ago), w/ PMHx of NIDDM, HTN, HLD, hypothyroidism BIBEMS s/p being found down on the ground x3days, found to have AGMA 2/2 starvation ketosis and rhabdomyolysis, currently undergoing syncopal workup. Neurology was consulted to evaluate for neurologic causes of syncope. CTH negative for any intracranial pathology. Patient denies headaches, migraines, auras, history of seizures, gait instability, dizziness, lightheadedness. Physical exam shows chronic L-sided weakness. 76F former smoker (quit > 20 years ago), w/ PMHx of NIDDM, HTN, HLD, hypothyroidism BIBEMS s/p being found down on the ground x3days, found to have AGMA 2/2 starvation ketosis and rhabdomyolysis, currently undergoing syncopal workup. Neurology was consulted to evaluate for neurologic causes of syncope. CTH negative for any intracranial pathology. Patient denies headaches, migraines, auras, history of seizures, gait instability, dizziness, lightheadedness. Physical exam shows chronic L-sided weakness. Etiology of syncope to be determined.     Recommendation:  - Please place patient on vEEG x24h  - Please obtain MR Brain without contrast    Recommendations discussed with Dr. Iqbal and primary team.

## 2021-05-18 NOTE — H&P ADULT - HISTORY OF PRESENT ILLNESS
Patient is a 77 y/o Female, w/ PMHx of NIDDM, HTN, HLD, hypothyroidism, who presents s/p being found at home, on the ground, in the bathroom. Pt reports she went to the bathroom, and ended up on the floor. Pt does not known what happened. Pt was last spoken to on Friday (4 days ago). Pt was unable to be reached yesterday, therefore a building staff member went into the apartment today, finding her on the floor. Pt denies CP, SOB, f/c, URI, GI,  sx. Pt was incontinent of urine. Pt denies specific complaints. Pt lives alone, normally ambulates on her own, performs all ADLs, and only has help at home in terms of housekeeping once weekly. Pt is accompanied in the ED by her nephew, whom lives in [place], who met the pt in the ED. He did not see the pt in her home. No AC use. Denies hx cardiac disease. Pt had COVID19 vaccine.    In the ED, VS- T:98.3 F BP: 136/98 mmHg  HR: 98 beats/min RR: 17 breaths/min spO2: 95% on RA   Labs significant for: WBC: 14.59 w/ left shift, Hgb/Hct: 16.1/49.7, CO: 18, BUN/Cr: 61/1.23, glucose: 203, AST/ALT: 96/55, Beta-hydroxy butyrate: 5.0, CK: 4671 ->3787. CKMB: 232 ->181.5, troponin: 0.13->0.10. UA with moderate bilirubin, ketones: 40, protein: 30, + leukocyte esterase. CT Head non-contrast: negative for intracranial abnormality. CT Abdomen/Pelvis: No acute pathology identified. Gallbladder compacted with stones.1.3 cm left renal hypodense lesion measuring 23 Hounsfield units. Recommend follow-up with ultrasound to differentiate cyst versus solid lesion. No acute pathology identified. CT Chest with calcified aorta.     Patient received:   Patient admitted to cardiology/telemetry for further management of syncope and rhabdomyolysis.  Patient is a 75 y/o Female, former smoker (quit > 20 years ago), w/ PMHx of NIDDM, HTN, HLD, hypothyroidism, who presents s/p being found at home, on the ground, in the bathroom. Pt reports she used the bathroom and ended up on the floor without recalling how or events leading prior. Pt was last spoken to on Friday (4 days ago). Pt was unable to be reached yesterday, therefore a building staff member went into the apartment today, finding her on the floor. Pt denies CP, SOB, f/c, URI, GI,  sx. Pt was incontinent of urine. Pt denies specific complaints. Pt lives alone, normally ambulates on her own, performs all ADLs, and only has help at home in terms of housekeeping once weekly. Pt is accompanied in the ED by her nephew, whom lives in [place], who met the pt in the ED. He did not see the pt in her home. No AC use. Denies hx cardiac disease. Pt had COVID19 vaccine.    In the ED, VS- T:98.3 F BP: 136/98 mmHg  HR: 98 beats/min RR: 17 breaths/min spO2: 95% on RA   Labs significant for: WBC: 14.59 w/ left shift, Hgb/Hct: 16.1/49.7, CO: 18, BUN/Cr: 61/1.23, glucose: 203, AST/ALT: 96/55, Beta-hydroxy butyrate: 5.0, CK: 4671 ->3787. CKMB: 232 ->181.5, troponin: 0.13->0.10. UA with moderate bilirubin, ketones: 40, protein: 30, + leukocyte esterase. CT Head non-contrast: negative for intracranial abnormality. CT Abdomen/Pelvis: No acute pathology identified. Gallbladder compacted with stones.1.3 cm left renal hypodense lesion measuring 23 Hounsfield units. Recommend follow-up with ultrasound to differentiate cyst versus solid lesion. No acute pathology identified. CT Chest with calcified aorta.     Patient received: IV NS 1000 cc bolus x 2   Patient admitted to cardiology/telemetry for further management of syncope and rhabdomyolysis.

## 2021-05-18 NOTE — H&P ADULT - ASSESSMENT
Patient is a 77 y/o Female, former smoker (quit > 20 years ago), w/ PMHx of NIDDM, HTN, HLD, hypothyroidism, who presents s/p being found at home, on the ground, in the bathroom. Pt reports she used the bathroom and ended up on the floor without recalling how or events leading prior. Pt was last spoken to on Friday (4 days ago). Pt was unable to be reached yesterday, therefore a building staff member went into the apartment today, finding her on the floor. Patient found to have a CK elevated to 4671 and a troponin of 0.13. Patient admitted to cardiology/telemetry for further management of syncope and rhabdomyolysis.

## 2021-05-18 NOTE — H&P ADULT - PROBLEM SELECTOR PLAN 7
F/u thyroid studies   - Continue home meds:    F: 1/2 NS at 100 cc/hr x 6 hours   E: K>4, Mg>2  N: Consistent Carb, DASH TLC  Dvt: lovenox SC  Dispo: pending clinical progression

## 2021-05-18 NOTE — H&P ADULT - PROBLEM SELECTOR PLAN 3
Patient severely dehydrated on admission   - Continue to measure labs as above  - continue IV fluids

## 2021-05-18 NOTE — CONSULT NOTE ADULT - ASSESSMENT
75 y/o Female, former smoker (quit > 20 years ago), w/ PMHx of NIDDM, HTN, HLD, hypothyroidism, who was admitted to [facility] for evaluation of unwitnessed syncopal episode  Will follow along, monitor telemetry for any cardiac arrhthymias would recommend implantation of ILR for long term cardiac monitoring before discharge,  77 y/o Female, former smoker (quit > 20 years ago), w/ PMHx of NIDDM, HTN, HLD, hypothyroidism, who was admitted to [facility] for evaluation of unwitnessed syncopal episode  Will follow along, monitor telemetry for any cardiac arrhthymias. Please repeat ECG, if patient remains tachycardic may consider initiation of BB for atrial tachycardia,  would recommend implantation of ILR for long term cardiac monitoring before discharge,

## 2021-05-18 NOTE — H&P ADULT - PROBLEM SELECTOR PLAN 6
F/u thyroid studies   - Continue home meds:    F: 1/2 NS at 75 cc/hr x 8 hours   E: K>4, Mg>2  N: Consistent Carb   Dvt: heparin  Dispo: pending clinical progression F/u lipid panel  - Continue home Simvastatin 40 mg qhs

## 2021-05-18 NOTE — CONSULT NOTE ADULT - SUBJECTIVE AND OBJECTIVE BOX
NEUROLOGY INITIAL CONSULT NOTE    HPI:  Patient is a 75 y/o Female, former smoker (quit > 20 years ago), w/ PMHx of NIDDM, HTN, HLD, hypothyroidism, who presents s/p being found at home, on the ground, in the bathroom. Pt reports she used the bathroom and ended up on the floor without recalling how or events leading prior. Pt was last spoken to on Friday (4 days ago). Pt was unable to be reached yesterday, therefore a building staff member went into the apartment today, finding her on the floor. Pt denies CP, SOB, f/c, URI, GI,  sx. Pt was incontinent of urine. Pt denies specific complaints. Pt lives alone, normally ambulates on her own, performs all ADLs, and only has help at home in terms of housekeeping once weekly. Pt is accompanied in the ED by her nephew, whom lives in [place], who met the pt in the ED. He did not see the pt in her home. No AC use. Denies hx cardiac disease. Pt had COVID19 vaccine.    In the ED, VS- T:98.3 F BP: 136/98 mmHg  HR: 98 beats/min RR: 17 breaths/min spO2: 95% on RA   Labs significant for: WBC: 14.59 w/ left shift, Hgb/Hct: 16.1/49.7, CO: 18, BUN/Cr: 61/1.23, glucose: 203, AST/ALT: 96/55, Beta-hydroxy butyrate: 5.0, CK: 4671 ->3787. CKMB: 232 ->181.5, troponin: 0.13->0.10. UA with moderate bilirubin, ketones: 40, protein: 30, + leukocyte esterase. CT Head non-contrast: negative for intracranial abnormality. CT Abdomen/Pelvis: No acute pathology identified. Gallbladder compacted with stones.1.3 cm left renal hypodense lesion measuring 23 Hounsfield units. Recommend follow-up with ultrasound to differentiate cyst versus solid lesion. No acute pathology identified. CT Chest with calcified aorta.     Patient received: IV NS 1000 cc bolus x 2   Patient admitted to cardiology/telemetry for further management of syncope and rhabdomyolysis.  (18 May 2021 02:25)      REVIEW OF SYSTEMS:  Constitutional: No fever, weight loss or fatigue  Eyes: No eye pain, visual disturbances, or discharge  ENMT:  No difficulty hearing, tinnitus, vertigo; No sinus or throat pain  Neck: No pain, stiffness or neck swelling  Respiratory: No shortness of breath  Cardiovascular: No chest pain, palpitations, dizziness or leg swelling  Gastrointestinal: No abdominal or epigastric pain. No nausea, vomiting or hematemesis; No diarrhea or constipation. No melena or hematochezia.  Genitourinary: No dysuria, frequency, hematuria or incontinence  Neurological: No headaches, memory loss, loss of strength, numbness or tremors  Skin: No itching, burning, rashes or lesions   Lymph Nodes: No enlarged glands  Endocrine: No heat or cold intolerance; No hair loss  Musculoskeletal: No joint pain or swelling; No muscle, back or extremity pain  Psychiatric: No depression, anxiety, mood swings or difficulty sleeping  Heme/Lymph: No easy bruising or bleeding gums  Allergy and Immunologic: No hives or eczema    PAST MEDICAL & SURGICAL HISTORY:  Diabetes mellitus    Hypertension    Hypothyroidism        FAMILY HISTORY:      SOCIAL HISTORY:  Smoking Status: [ ] Current, [ ] Former, [ ] Never  Pack Years:    MEDICATIONS:  Pulmonary:    Antimicrobials:    Anticoagulants:  enoxaparin Injectable 40 milliGRAM(s) SubCutaneous every 24 hours    Onc:    GI/:    Endocrine:  dextrose 40% Gel 15 Gram(s) Oral once  dextrose 50% Injectable 25 Gram(s) IV Push once  dextrose 50% Injectable 12.5 Gram(s) IV Push once  dextrose 50% Injectable 25 Gram(s) IV Push once  glucagon  Injectable 1 milliGRAM(s) IntraMuscular once  insulin lispro (ADMELOG) corrective regimen sliding scale   SubCutaneous Before meals and at bedtime  levothyroxine 125 MICROGram(s) Oral daily  simvastatin 40 milliGRAM(s) Oral at bedtime    Cardiac:    Other Medications:  dextrose 5%. 1000 milliLiter(s) IV Continuous <Continuous>  dextrose 5%. 1000 milliLiter(s) IV Continuous <Continuous>  nystatin Cream 1 Application(s) Topical two times a day  sodium chloride 0.45%. 1000 milliLiter(s) IV Continuous <Continuous>  sodium chloride 0.9%. 500 milliLiter(s) IV Continuous <Continuous>      Allergies    No Known Allergies    Intolerances        Vital Signs Last 24 Hrs  T(C): 36.2 (18 May 2021 09:00), Max: 37 (18 May 2021 03:05)  T(F): 97.1 (18 May 2021 09:00), Max: 98.6 (18 May 2021 03:05)  HR: 101 (18 May 2021 08:52) (98 - 116)  BP: 158/67 (18 May 2021 06:23) (126/79 - 160/82)  BP(mean): 110 (18 May 2021 02:25) (110 - 110)  RR: 18 (18 May 2021 08:52) (16 - 18)  SpO2: 95% (18 May 2021 08:52) (94% - 96%)    05-18 @ 07:01  -  05-18 @ 11:24  --------------------------------------------------------  IN: 180 mL / OUT: 0 mL / NET: 180 mL          PHYSICAL EXAM:  Constitutional: WDWN  Head: NC/AT  EENT: PERRL, anicteric sclera; oropharynx clear, MMM  Neck: supple, no appreciable JVD  Respiratory: CTA B/L; no W/R/R  Cardiovascular: +S1/S2, RRR  Gastrointestinal: soft, NT/ND; +BSx4  Extremities: WWP; no edema, clubbing or cyanosis  Vascular: 2+ radial, DP/PT pulses B/L  Neurological: AAOx3; no focal deficits    LABS:      CBC Full  -  ( 18 May 2021 05:51 )  WBC Count : 11.70 K/uL  RBC Count : 4.70 M/uL  Hemoglobin : 14.5 g/dL  Hematocrit : 44.1 %  Platelet Count - Automated : 238 K/uL  Mean Cell Volume : 93.8 fl  Mean Cell Hemoglobin : 30.9 pg  Mean Cell Hemoglobin Concentration : 32.9 gm/dL  Auto Neutrophil # : 8.99 K/uL  Auto Lymphocyte # : 1.49 K/uL  Auto Monocyte # : 1.14 K/uL  Auto Eosinophil # : 0.01 K/uL  Auto Basophil # : 0.03 K/uL  Auto Neutrophil % : 76.9 %  Auto Lymphocyte % : 12.7 %  Auto Monocyte % : 9.7 %  Auto Eosinophil % : 0.1 %  Auto Basophil % : 0.3 %    05-18    143  |  109<H>  |  49<H>  ----------------------------<  154<H>  4.0   |  18<L>  |  0.90    Ca    8.9      18 May 2021 05:51  Phos  3.1     05-18  Mg     2.2     05-18    TPro  6.4  /  Alb  3.1<L>  /  TBili  0.4  /  DBili  x   /  AST  69<H>  /  ALT  46<H>  /  AlkPhos  59  05-18    PTT - ( 18 May 2021 05:51 )  PTT:26.0 sec      Urinalysis Basic - ( 18 May 2021 00:17 )    Color: Yellow / Appearance: Clear / SG: >=1.030 / pH: x  Gluc: x / Ketone: 40 mg/dL  / Bili: Moderate / Urobili: 0.2 E.U./dL   Blood: x / Protein: 30 mg/dL / Nitrite: NEGATIVE   Leuk Esterase: NEGATIVE / RBC: < 5 /HPF / WBC < 5 /HPF   Sq Epi: x / Non Sq Epi: 0-5 /HPF / Bacteria: Present /HPF                RADIOLOGY & ADDITIONAL STUDIES: NEUROLOGY INITIAL CONSULT NOTE    HPI:  Patient is a 77 y/o Female, former smoker (quit > 20 years ago), w/ PMHx of NIDDM, HTN, HLD, hypothyroidism, who presents s/p being found at home, on the ground, in the bathroom. Pt reports she used the bathroom and ended up on the floor without recalling how or events leading prior. Pt was last spoken to on Friday (4 days ago). Pt was unable to be reached yesterday, therefore a building staff member went into the apartment today, finding her on the floor. Pt denies CP, SOB, f/c, URI, GI,  sx. Pt was incontinent of urine. Pt denies specific complaints. Pt lives alone, normally ambulates on her own, performs all ADLs, and only has help at home in terms of housekeeping once weekly. Pt is accompanied in the ED by her nephew, who lives in [place], who met the pt in the ED. He did not see the pt in her home. No AC use. Denies hx cardiac disease. Pt had COVID19 vaccine.    In the ED, VS- T:98.3 F BP: 136/98 mmHg  HR: 98 beats/min RR: 17 breaths/min spO2: 95% on RA   Labs significant for: WBC: 14.59 w/ left shift, Hgb/Hct: 16.1/49.7, CO: 18, BUN/Cr: 61/1.23, glucose: 203, AST/ALT: 96/55, Beta-hydroxy butyrate: 5.0, CK: 4671 ->3787. CKMB: 232 ->181.5, troponin: 0.13->0.10. UA with moderate bilirubin, ketones: 40, protein: 30, + leukocyte esterase. CT Head non-contrast: negative for intracranial abnormality. CT Abdomen/Pelvis: No acute pathology identified. Gallbladder compacted with stones.1.3 cm left renal hypodense lesion measuring 23 Hounsfield units. Recommend follow-up with ultrasound to differentiate cyst versus solid lesion. No acute pathology identified. CT Chest with calcified aorta.     Patient received: IV NS 1000 cc bolus x 2   Patient admitted to cardiology/telemetry for further management of syncope and rhabdomyolysis.  (18 May 2021 02:25)      REVIEW OF SYSTEMS:  Constitutional: No fever, weight loss or fatigue  Eyes: No eye pain, visual disturbances, or discharge  ENMT:  No difficulty hearing, tinnitus, vertigo; No sinus or throat pain  Neck: No pain, stiffness or neck swelling  Respiratory: No shortness of breath  Cardiovascular: No chest pain, palpitations, dizziness or leg swelling  Gastrointestinal: No abdominal or epigastric pain. No nausea, vomiting or hematemesis; No diarrhea or constipation. No melena or hematochezia.  Genitourinary: No dysuria, frequency, hematuria or incontinence  Neurological: No headaches, memory loss, loss of strength, numbness or tremors  Skin: No itching, burning, rashes or lesions   Lymph Nodes: No enlarged glands  Endocrine: No heat or cold intolerance; No hair loss  Musculoskeletal: No joint pain or swelling; No muscle, back or extremity pain  Psychiatric: No depression, anxiety, mood swings or difficulty sleeping  Heme/Lymph: No easy bruising or bleeding gums  Allergy and Immunologic: No hives or eczema    PAST MEDICAL & SURGICAL HISTORY:  Diabetes mellitus    Hypertension    Hypothyroidism        FAMILY HISTORY:      SOCIAL HISTORY:  Former PPD smoker, quit 20 yrs. ago, Denies EtOH, denies illicit drug use. Lives alone, independent of all ADLs. Retired     MEDICATIONS:  Pulmonary:    Antimicrobials:    Anticoagulants:  enoxaparin Injectable 40 milliGRAM(s) SubCutaneous every 24 hours    Onc:    GI/:    Endocrine:  dextrose 40% Gel 15 Gram(s) Oral once  dextrose 50% Injectable 25 Gram(s) IV Push once  dextrose 50% Injectable 12.5 Gram(s) IV Push once  dextrose 50% Injectable 25 Gram(s) IV Push once  glucagon  Injectable 1 milliGRAM(s) IntraMuscular once  insulin lispro (ADMELOG) corrective regimen sliding scale   SubCutaneous Before meals and at bedtime  levothyroxine 125 MICROGram(s) Oral daily  simvastatin 40 milliGRAM(s) Oral at bedtime    Cardiac:    Other Medications:  dextrose 5%. 1000 milliLiter(s) IV Continuous <Continuous>  dextrose 5%. 1000 milliLiter(s) IV Continuous <Continuous>  nystatin Cream 1 Application(s) Topical two times a day  sodium chloride 0.45%. 1000 milliLiter(s) IV Continuous <Continuous>  sodium chloride 0.9%. 500 milliLiter(s) IV Continuous <Continuous>      Allergies    No Known Allergies    Intolerances        Vital Signs Last 24 Hrs  T(C): 36.2 (18 May 2021 09:00), Max: 37 (18 May 2021 03:05)  T(F): 97.1 (18 May 2021 09:00), Max: 98.6 (18 May 2021 03:05)  HR: 101 (18 May 2021 08:52) (98 - 116)  BP: 158/67 (18 May 2021 06:23) (126/79 - 160/82)  BP(mean): 110 (18 May 2021 02:25) (110 - 110)  RR: 18 (18 May 2021 08:52) (16 - 18)  SpO2: 95% (18 May 2021 08:52) (94% - 96%)    05-18 @ 07:01  -  05-18 @ 11:24  --------------------------------------------------------  IN: 180 mL / OUT: 0 mL / NET: 180 mL          PHYSICAL EXAM:  Physical exam:  General: No acute distress, awake and alert  Cardiovascular: Regular rate and rhythm, no murmurs, rubs, or gallops. No bruits  Pulmonary: Anterior breath sounds clear bilaterally, no crackles or wheezing. No use of accessory muscles  Extremities: Radial and DP pulses +2, no edema, scrapes and bruises evident on elbows and knees, worse on LLE    Neurologic:  -Mental status: Awake, alert, oriented to person, place, and time. Speech is fluent with intact naming, repetition, and comprehension, no dysarthria. Recent and remote memory intact. Follows commands. Attention/concentration intact. Fund of knowledge appropriate.  -Cranial nerves:   II: Visual fields are full to confrontation.  III, IV, VI: Extraocular movements are intact without nystagmus. Pupils equally round and reactive to light (2.5mm)  V:  Facial sensation V1-V3 equal and intact   VII: Face is symmetric with normal eye closure and smile  VIII: Hearing is bilaterally intact to finger rub  IX, X: Uvula is midline and soft palate rises symmetrically  XI: Head turning and shoulder shrug are intact.  XII: Tongue protrudes midline  Motor: Normal bulk and tone. No pronator drift. Strength R>L (RUE 5/5, LUE 4+/5, RLE 5/5, LLE 3/5)  Rapid alternating movements intact and symmetric  Sensation: Intact to light touch bilaterally. No neglect or extinction on double simultaneous testing.  Coordination: No dysmetria on finger-to-nose and heel-to-shin bilaterally  Reflexes: Downgoing toes bilaterally   Gait: not assessed      LABS:      CBC Full  -  ( 18 May 2021 05:51 )  WBC Count : 11.70 K/uL  RBC Count : 4.70 M/uL  Hemoglobin : 14.5 g/dL  Hematocrit : 44.1 %  Platelet Count - Automated : 238 K/uL  Mean Cell Volume : 93.8 fl  Mean Cell Hemoglobin : 30.9 pg  Mean Cell Hemoglobin Concentration : 32.9 gm/dL  Auto Neutrophil # : 8.99 K/uL  Auto Lymphocyte # : 1.49 K/uL  Auto Monocyte # : 1.14 K/uL  Auto Eosinophil # : 0.01 K/uL  Auto Basophil # : 0.03 K/uL  Auto Neutrophil % : 76.9 %  Auto Lymphocyte % : 12.7 %  Auto Monocyte % : 9.7 %  Auto Eosinophil % : 0.1 %  Auto Basophil % : 0.3 %    05-18    143  |  109<H>  |  49<H>  ----------------------------<  154<H>  4.0   |  18<L>  |  0.90    Ca    8.9      18 May 2021 05:51  Phos  3.1     05-18  Mg     2.2     05-18    TPro  6.4  /  Alb  3.1<L>  /  TBili  0.4  /  DBili  x   /  AST  69<H>  /  ALT  46<H>  /  AlkPhos  59  05-18    PTT - ( 18 May 2021 05:51 )  PTT:26.0 sec      Urinalysis Basic - ( 18 May 2021 00:17 )    Color: Yellow / Appearance: Clear / SG: >=1.030 / pH: x  Gluc: x / Ketone: 40 mg/dL  / Bili: Moderate / Urobili: 0.2 E.U./dL   Blood: x / Protein: 30 mg/dL / Nitrite: NEGATIVE   Leuk Esterase: NEGATIVE / RBC: < 5 /HPF / WBC < 5 /HPF   Sq Epi: x / Non Sq Epi: 0-5 /HPF / Bacteria: Present /HPF                RADIOLOGY & ADDITIONAL STUDIES: NEUROLOGY INITIAL CONSULT NOTE    HPI:  Patient is a 77 y/o Female, former smoker (quit > 20 years ago), w/ PMHx of NIDDM, HTN, HLD, hypothyroidism, who presents s/p being found at home, on the ground, in the bathroom. Pt reports she used the bathroom and ended up on the floor without recalling how or events leading prior. Pt was last spoken to on Friday (4 days ago). Pt was unable to be reached yesterday, therefore a building staff member went into the apartment today, finding her on the floor. Pt denies CP, SOB, f/c, URI, GI,  sx. Pt was incontinent of urine. Pt denies specific complaints. Pt lives alone, normally ambulates on her own, performs all ADLs, and only has help at home in terms of housekeeping once weekly. Pt is accompanied in the ED by her nephew, who lives in [place], who met the pt in the ED. He did not see the pt in her home. No AC use. Denies hx cardiac disease. Pt had COVID19 vaccine.    In the ED, VS- T:98.3 F BP: 136/98 mmHg  HR: 98 beats/min RR: 17 breaths/min spO2: 95% on RA   Labs significant for: WBC: 14.59 w/ left shift, Hgb/Hct: 16.1/49.7, CO: 18, BUN/Cr: 61/1.23, glucose: 203, AST/ALT: 96/55, Beta-hydroxy butyrate: 5.0, CK: 4671 ->3787. CKMB: 232 ->181.5, troponin: 0.13->0.10. UA with moderate bilirubin, ketones: 40, protein: 30, + leukocyte esterase. CT Head non-contrast: negative for intracranial abnormality. CT Abdomen/Pelvis: No acute pathology identified. Gallbladder compacted with stones.1.3 cm left renal hypodense lesion measuring 23 Hounsfield units. Recommend follow-up with ultrasound to differentiate cyst versus solid lesion. No acute pathology identified. CT Chest with calcified aorta.     Patient received: IV NS 1000 cc bolus x 2   Patient admitted to cardiology/telemetry for further management of syncope and rhabdomyolysis.  (18 May 2021 02:25)    Pt doesn't remember the events leading up to her syncopal episode.      REVIEW OF SYSTEMS:  Constitutional: No fever, weight loss or fatigue  Eyes: No eye pain, visual disturbances, or discharge  ENMT:  No difficulty hearing, tinnitus, vertigo; No sinus or throat pain  Neck: No pain, stiffness or neck swelling  Respiratory: No shortness of breath  Cardiovascular: No chest pain, palpitations, dizziness or leg swelling  Gastrointestinal: No abdominal or epigastric pain. No nausea, vomiting or hematemesis; No diarrhea or constipation. No melena or hematochezia.  Genitourinary: No dysuria, frequency, hematuria or incontinence  Neurological: No headaches, memory loss, loss of strength, numbness or tremors  Skin: No itching, burning, rashes or lesions   Lymph Nodes: No enlarged glands  Endocrine: No heat or cold intolerance; No hair loss  Musculoskeletal: No joint pain or swelling; No muscle, back or extremity pain  Psychiatric: No depression, anxiety, mood swings or difficulty sleeping  Heme/Lymph: No easy bruising or bleeding gums  Allergy and Immunologic: No hives or eczema    PAST MEDICAL & SURGICAL HISTORY:  Diabetes mellitus    Hypertension    Hypothyroidism        FAMILY HISTORY:      SOCIAL HISTORY:  Former PPD smoker, quit 20 yrs. ago, Denies EtOH, denies illicit drug use. Lives alone, independent of all ADLs. Retired     MEDICATIONS:  Pulmonary:    Antimicrobials:    Anticoagulants:  enoxaparin Injectable 40 milliGRAM(s) SubCutaneous every 24 hours    Onc:    GI/:    Endocrine:  dextrose 40% Gel 15 Gram(s) Oral once  dextrose 50% Injectable 25 Gram(s) IV Push once  dextrose 50% Injectable 12.5 Gram(s) IV Push once  dextrose 50% Injectable 25 Gram(s) IV Push once  glucagon  Injectable 1 milliGRAM(s) IntraMuscular once  insulin lispro (ADMELOG) corrective regimen sliding scale   SubCutaneous Before meals and at bedtime  levothyroxine 125 MICROGram(s) Oral daily  simvastatin 40 milliGRAM(s) Oral at bedtime    Cardiac:    Other Medications:  dextrose 5%. 1000 milliLiter(s) IV Continuous <Continuous>  dextrose 5%. 1000 milliLiter(s) IV Continuous <Continuous>  nystatin Cream 1 Application(s) Topical two times a day  sodium chloride 0.45%. 1000 milliLiter(s) IV Continuous <Continuous>  sodium chloride 0.9%. 500 milliLiter(s) IV Continuous <Continuous>      Allergies    No Known Allergies    Intolerances        Vital Signs Last 24 Hrs  T(C): 36.2 (18 May 2021 09:00), Max: 37 (18 May 2021 03:05)  T(F): 97.1 (18 May 2021 09:00), Max: 98.6 (18 May 2021 03:05)  HR: 101 (18 May 2021 08:52) (98 - 116)  BP: 158/67 (18 May 2021 06:23) (126/79 - 160/82)  BP(mean): 110 (18 May 2021 02:25) (110 - 110)  RR: 18 (18 May 2021 08:52) (16 - 18)  SpO2: 95% (18 May 2021 08:52) (94% - 96%)    05-18 @ 07:01  -  05-18 @ 11:24  --------------------------------------------------------  IN: 180 mL / OUT: 0 mL / NET: 180 mL          PHYSICAL EXAM:  Physical exam:  General: No acute distress, awake and alert  Cardiovascular: Regular rate and rhythm, no murmurs, rubs, or gallops. No bruits  Pulmonary: Anterior breath sounds clear bilaterally, no crackles or wheezing. No use of accessory muscles  Extremities: Radial and DP pulses +2, no edema, scrapes and bruises evident on elbows and knees, worse on LLE    Neurologic:  -Mental status: Awake, alert, oriented to person, place, and time. Speech is fluent with intact naming, repetition, and comprehension, no dysarthria. Recent and remote memory intact. Follows commands. Attention/concentration intact. Fund of knowledge appropriate.  -Cranial nerves:   II: Visual fields are full to confrontation.  III, IV, VI: Extraocular movements are intact without nystagmus. Pupils equally round and reactive to light (2.5mm)  V:  Facial sensation V1-V3 equal and intact   VII: Face is symmetric with normal eye closure and smile  VIII: Hearing is bilaterally intact to finger rub  IX, X: Uvula is midline and soft palate rises symmetrically  XI: Head turning and shoulder shrug are intact.  XII: Tongue protrudes midline  Motor: Normal bulk and tone. No pronator drift. Strength R>L (RUE 5/5, LUE 4+/5, RLE 5/5, LLE 4/5)  Rapid alternating movements intact and symmetric  Sensation: Intact to light touch bilaterally. No neglect or extinction on double simultaneous testing.  Coordination: No dysmetria on finger-to-nose and heel-to-shin bilaterally  Reflexes: Downgoing toes bilaterally   Gait: not assessed      LABS:      CBC Full  -  ( 18 May 2021 05:51 )  WBC Count : 11.70 K/uL  RBC Count : 4.70 M/uL  Hemoglobin : 14.5 g/dL  Hematocrit : 44.1 %  Platelet Count - Automated : 238 K/uL  Mean Cell Volume : 93.8 fl  Mean Cell Hemoglobin : 30.9 pg  Mean Cell Hemoglobin Concentration : 32.9 gm/dL  Auto Neutrophil # : 8.99 K/uL  Auto Lymphocyte # : 1.49 K/uL  Auto Monocyte # : 1.14 K/uL  Auto Eosinophil # : 0.01 K/uL  Auto Basophil # : 0.03 K/uL  Auto Neutrophil % : 76.9 %  Auto Lymphocyte % : 12.7 %  Auto Monocyte % : 9.7 %  Auto Eosinophil % : 0.1 %  Auto Basophil % : 0.3 %    05-18    143  |  109<H>  |  49<H>  ----------------------------<  154<H>  4.0   |  18<L>  |  0.90    Ca    8.9      18 May 2021 05:51  Phos  3.1     05-18  Mg     2.2     05-18    TPro  6.4  /  Alb  3.1<L>  /  TBili  0.4  /  DBili  x   /  AST  69<H>  /  ALT  46<H>  /  AlkPhos  59  05-18    PTT - ( 18 May 2021 05:51 )  PTT:26.0 sec      Urinalysis Basic - ( 18 May 2021 00:17 )    Color: Yellow / Appearance: Clear / SG: >=1.030 / pH: x  Gluc: x / Ketone: 40 mg/dL  / Bili: Moderate / Urobili: 0.2 E.U./dL   Blood: x / Protein: 30 mg/dL / Nitrite: NEGATIVE   Leuk Esterase: NEGATIVE / RBC: < 5 /HPF / WBC < 5 /HPF   Sq Epi: x / Non Sq Epi: 0-5 /HPF / Bacteria: Present /HPF                RADIOLOGY & ADDITIONAL STUDIES:

## 2021-05-18 NOTE — H&P ADULT - PROBLEM SELECTOR PLAN 4
Hx of diabetes  - F/u a1c  - Continue MISS  - F/u beta hydroxybutate, anion gap in am Hx of diabetes; found with a glucose of >300 and anion gap, likely elevated due to dehydration as patient's glucose improved with IV fluids   - F/u a1c  - Home meds: Invokana 300, Glimeperide 2, Metformin 1000 mg BID   - Continue MISS  - F/u beta hydroxybutate, anion gap in am

## 2021-05-18 NOTE — H&P ADULT - PROBLEM SELECTOR PLAN 1
- Abrupt LOC while in bathroom - patient unclear what happened prior to or when she fell, but was found on the ground after many days.   - Head CT negative for intracranial pathology.   - Troponin: 0.13 -0.010  - EKG: Sinus tachycardia at 108 bpm, poor R wave progression, PVCs   - CT abdomen/pelvis: negative for acute pathology. Lactate negative.   - UA + for ketones, leukocyte esterase, glucose, f/u culture   - Patient found to be severely dehydrated, unclear if dehydration occurred before or after fall   - F/u tsh, procalcitonin, blood cultures, lactate in am, bhb  - tele monitoring and echo ordered   - consider EP or neuro - Abrupt LOC while in bathroom - patient unclear what happened prior to or when she fell, but was found on the ground after about 3 days   - Head CT negative for intracranial pathology. Multiple limb xrays negative for acute fractures.   - Patient with ecchymosis on multiple areas of body.    - Troponin: 0.13 -0.010  - EKG: Sinus tachycardia at 108 bpm, poor R wave progression, PVCs   - CT abdomen/pelvis: negative for acute pathology. Lactate negative.   - UA + for ketones, leukocyte esterase, glucose, f/u urine culture   - Patient found to be severely dehydrated, unclear if dehydration occurred before or after fall   - F/u tsh, procalcitonin, blood cultures, lactate in am, bhb  - tele monitoring and echo ordered   - consider EP or neuro

## 2021-05-18 NOTE — PHYSICAL THERAPY INITIAL EVALUATION ADULT - PERTINENT HX OF CURRENT PROBLEM, REHAB EVAL
Patient is a 75 y/o Female, former smoker (quit > 20 years ago), w/ PMHx of NIDDM, HTN, HLD, hypothyroidism, who presents s/p being found at home, on the ground, in the bathroom. Pt reports she used the bathroom and ended up on the floor without recalling how or events leading prior. Pt was last spoken to on Friday (4 days ago). Pt was unable to be reached yesterday, therefore a building staff member went into the apartment today, finding her on the floor.

## 2021-05-18 NOTE — CONSULT NOTE ADULT - SUBJECTIVE AND OBJECTIVE BOX
Electrophysiology Consult Note:     CHIEF COMPLAINT:  Patient is a 76y old  Female who presents with a chief complaint of Syncope/ Rhabdomyolysis (18 May 2021 11:23)        HISTORY OF PRESENT ILLNESS:   HPI:   77 y/o Female, former smoker (quit > 20 years ago), w/ PMHx of NIDDM, HTN, HLD, hypothyroidism, who presents s/p being found at home, on the ground, in the bathroom. Pt reports she used the bathroom and ended up on the floor without recalling how or events leading prior. Pt was last spoken to on Friday (4 days ago). Pt was unable to be reached yesterday, therefore a building staff member went into the apartment today, finding her on the floor. Pt denies CP, SOB, f/c, URI, GI,  sx. Pt was incontinent of urine. Pt denies specific complaints. Pt lives alone, normally ambulates on her own, performs all ADLs, and only has help at home in terms of housekeeping once weekly. Pt is accompanied in the ED by her nephew, whom lives in [place], who met the pt in the ED. He did not see the pt in her home. No AC use. Denies hx cardiac disease. Pt had COVID19 vaccine.  EPS was called to evaluate.         PAST MEDICAL & SURGICAL HISTORY:  Diabetes mellitus    Hypertension    Hypothyroidism        FAMILY HISTORY:      SOCIAL HISTORY:    [x ] Non-smoker      Allergies    No Known Allergies    Intolerances    	    MEDICATIONS:  MEDICATIONS  (STANDING):  dextrose 40% Gel 15 Gram(s) Oral once  dextrose 5%. 1000 milliLiter(s) (50 mL/Hr) IV Continuous <Continuous>  dextrose 5%. 1000 milliLiter(s) (100 mL/Hr) IV Continuous <Continuous>  dextrose 50% Injectable 25 Gram(s) IV Push once  dextrose 50% Injectable 12.5 Gram(s) IV Push once  dextrose 50% Injectable 25 Gram(s) IV Push once  enoxaparin Injectable 40 milliGRAM(s) SubCutaneous every 24 hours  glucagon  Injectable 1 milliGRAM(s) IntraMuscular once  insulin lispro (ADMELOG) corrective regimen sliding scale   SubCutaneous Before meals and at bedtime  levothyroxine 125 MICROGram(s) Oral daily  nystatin Cream 1 Application(s) Topical two times a day  simvastatin 40 milliGRAM(s) Oral at bedtime  sodium chloride 0.45%. 1000 milliLiter(s) (100 mL/Hr) IV Continuous <Continuous>  sodium chloride 0.9%. 500 milliLiter(s) (75 mL/Hr) IV Continuous <Continuous>    MEDICATIONS  (PRN):        REVIEW OF SYSTEMS:  CONSTITUTIONAL: No fever, weight loss, or fatigue  EYES: No eye pain, visual disturbances, or discharge  ENMT:  No difficulty hearing, tinnitus, vertigo; No sinus or throat pain  NECK: No pain or stiffness  RESPIRATORY: No cough, wheezing, chills or hemoptysis; No Shortness of Breath  CARDIOVASCULAR: No chest pain, palpitations, dizziness, or leg swelling  GASTROINTESTINAL: No abdominal or epigastric pain. No nausea, vomiting, or hematemesis; No diarrhea or constipation.   GENITOURINARY: No dysuria, frequency, hematuria, or incontinence  NEUROLOGICAL: No headaches, memory loss, loss of strength, numbness, or tremors  SKIN: No itching, burning, rashes, or lesions   LYMPH Nodes: No enlarged glands  ENDOCRINE: No heat or cold intolerance; No hair loss        PHYSICAL EXAM:  Vital Signs Last 24 Hrs  T(C): 36.2 (18 May 2021 09:00), Max: 37 (18 May 2021 03:05)  T(F): 97.1 (18 May 2021 09:00), Max: 98.6 (18 May 2021 03:05)  HR: 92 (18 May 2021 12:08) (92 - 116)  BP: 146/64 (18 May 2021 12:08) (126/79 - 160/82)  BP(mean): 110 (18 May 2021 02:25) (110 - 110)  RR: 18 (18 May 2021 12:08) (16 - 18)  SpO2: 96% (18 May 2021 12:08) (94% - 96%)  Daily Height in cm: 154.94 (18 May 2021 04:20)    Daily     Constitutional: NAD	  HEENT:   PERRL, EOMI	  CVS: S1 S2, No JVD, No edema  Pulm: Lungs clear to auscultation	  GI:  Soft, Non-tender, + BS	  Ext: No LE edema  Neurologic: A&O x 3  Skin: No rash or lesion       	  LABS:	                         14.5   11.70 )-----------( 238      ( 18 May 2021 05:51 )             44.1     05-18    143  |  109<H>  |  49<H>  ----------------------------<  154<H>  4.0   |  18<L>  |  0.90    Ca    8.9      18 May 2021 05:51  Phos  3.1     05-18  Mg     2.2     05-18    TPro  6.4  /  Alb  3.1<L>  /  TBili  0.4  /  DBili  x   /  AST  69<H>  /  ALT  46<H>  /  AlkPhos  59  05-18    proBNP: Serum Pro-Brain Natriuretic Peptide: 2937 pg/mL (05-18 @ 05:51)    Lipid Profile:   HgA1c:   TSH: Thyroid Stimulating Hormone, Serum: 0.381 uIU/mL (05-18 @ 05:51)  	      EKG:   Telemetry: sinus rhythm     Echo: < from: TTE Echo Complete w/o Contrast w/ Doppler (05.18.21 @ 10:42) >  1. Mitral annular calcification noted. The mean transvalvular gradient is 5.00 mmHg at a heart rate of 96 bpm. There is trace mitral regurgitation.   2. No significant valvular disease.   3. The right ventricle is normal in size. Right ventricular systolic function is normal.   4. There is asymmetric left ventricular hypertrophy. Left ventricular systolic function is hyperdynamic with a calculated ejection fraction of >75%.   5. No pericardial effusion.   6. No prior echo is available for comparison.

## 2021-05-18 NOTE — PHYSICAL THERAPY INITIAL EVALUATION ADULT - GENERAL OBSERVATIONS, REHAB EVAL
Pt received semi supine in bed +prima fit +tele +hep lock. PT received consent to treat from ANYA Pena. Pt was left as received with call bell in reach, VSS, and in NAD. PT to follow up. FIM gait 3.

## 2021-05-18 NOTE — H&P ADULT - PROBLEM SELECTOR PLAN 5
Hx of HTN, on home meds:  - Continue: Hx of HTN, on home meds: Quinapril 40 mg once daily   - Holding ACE given CARLOS

## 2021-05-18 NOTE — H&P ADULT - PROBLEM SELECTOR PLAN 2
Patient found to be on floor for possibly 4 days s/p fall vs. syncope   - CPK: 4671-> 3787, BUN/CR: 61/1.23, AST/ALT: 96/55, WBC elevated to 14.59, Hgb/Hct: 16.1/49.7  - UA with moderate bilirubin, ketones, and protein   - Patient given NS 1000 ml bolus x 2 in ED  - Trend CPK, CMP, CBC, BHB, anion gap   - continue fluids - 1/2 NS at 75 cc/hr x 6 hours Patient found to be on floor for possibly 4 days s/p fall vs. syncope   - CPK: 4671-> 3787, BUN/CR: 61/1.23, AST/ALT: 96/55, WBC elevated to 14.59, Hgb/Hct: 16.1/49.7  - UA with moderate bilirubin, ketones, and protein   - Patient given NS 1000 ml bolus x 2 in ED  - Trend CPK, CMP, CBC, BHB, anion gap   - continue fluids - 1/2 NS at 100 cc/hr x 6 hours

## 2021-05-18 NOTE — H&P ADULT - RS GEN PE MLT RESP DETAILS PC
airway patent/breath sounds equal/good air movement/respirations non-labored/no rales/no rhonchi/no subcutaneous emphysema

## 2021-05-18 NOTE — CHART NOTE - NSCHARTNOTEFT_GEN_A_CORE
75 y/o Female, former smoker (quit > 20 years ago), w/ PMHx of NIDDM, HTN, HLD, hypothyroidism, who presents s/p being found at home, on the ground, in the bathroom. Pt reports she used the bathroom and ended up on the floor without recalling how or events leading prior. Pt was last spoken to on Friday (4 days ago). Pt was unable to be reached yesterday, therefore a building staff member went into the apartment today, finding her on the floor. Patient found to have a CK elevated to 4671 and a troponin of 0.13. Patient admitted to cardiology/telemetry for further management of syncope and rhabdomyolysis.     CK/CKMB and trop continued to trend down with IVF running. Pt ordered for echocardiogram to r/o valvular and/or wall motion and CCTA to r/o ischemic causes of syncope given risk factors. CTH and CT C/A/P negative for acute changes. 77 y/o Female, former smoker (quit > 20 years ago), w/ PMHx of NIDDM, HTN, HLD, hypothyroidism, who presents s/p being found at home, on the ground, in the bathroom. Pt reports she used the bathroom and ended up on the floor without recalling how or events leading prior. Pt was last spoken to on Friday (4 days ago). Pt was unable to be reached yesterday, therefore a building staff member went into the apartment today, finding her on the floor. Patient found to have a CK elevated to 4671 and a troponin of 0.13. Patient admitted to cardiology/telemetry for further management of syncope and rhabdomyolysis.     CK/CKMB and trop continued to trend down with IVF running. Pt ordered for echocardiogram to r/o valvular and/or wall motion causes of syncope. CCTA ordered to r/o ischemic causes of syncope given risk factors. CTH and CT C/A/P negative for acute changes. Thyroid function WNL. Neurology consulted for neurologic cause of syncope. EP consulted for ? loop recorder vs outpatient cardiac monitor. Pt ordered. 77 y/o Female, former smoker (quit > 20 years ago), w/ PMHx of NIDDM, HTN, HLD, hypothyroidism, who presents s/p being found at home, on the ground, in the bathroom. Pt reports she used the bathroom and ended up on the floor without recalling how or events leading prior. Pt was last spoken to on Friday (4 days ago). Pt was unable to be reached yesterday, therefore a building staff member went into the apartment today, finding her on the floor. Patient found to have a CK elevated to 4671 and a troponin of 0.13. Patient admitted to cardiology/telemetry for further management of syncope and rhabdomyolysis.     CK/CKMB and trop continued to trend down with IVF running. Echocardiogram 5/18: hyperdynamic LVEF 75% w/ asymetrical LVH, no gradient. CCTA ordered to r/o ischemic causes of syncope given risk factors. 18G IV placed by cardio fellow and another one by Evangelist Beck NP. CTH and CT C/A/P negative for acute changes. Thyroid function WNL. Neurology consulted for neurologic cause of syncope. EP consulted with plan for likely loop prior to d/c. PT rec home with home PT. 77 y/o Female, former smoker (quit > 20 years ago), w/ PMHx of NIDDM, HTN, HLD, hypothyroidism, who presents s/p being found at home, on the ground, in the bathroom. Pt reports she used the bathroom and ended up on the floor without recalling how or events leading prior. Pt was last spoken to on Friday (4 days ago). Pt was unable to be reached yesterday, therefore a building staff member went into the apartment today, finding her on the floor. Patient found to have a CK elevated to 4671 and a troponin of 0.13. Patient admitted to cardiology/telemetry for further management of syncope and rhabdomyolysis.     CK/CKMB and trop continued to trend down with IVF running. Echocardiogram 5/18: hyperdynamic LVEF 75% w/ asymetrical LVH, no gradient. CCTA ordered to r/o ischemic causes of syncope given risk factors. 18G IV placed by cardio fellow and another one by Evangelist Beck NP. CTH and CT C/A/P negative for acute changes. Thyroid function WNL. Neurology consulted and rec 24hr v-EEG and MRI brain w/o contrast (ordered). EP consulted with plan for likely loop prior to d/c. PT rec home with home PT. 77 y/o Female, former smoker (quit > 20 years ago), w/ PMHx of NIDDM, HTN, HLD, hypothyroidism, who presents s/p being found at home, on the ground, in the bathroom. Pt reports she used the bathroom and ended up on the floor without recalling how or events leading prior. Pt was last spoken to on Friday (4 days ago). Pt was unable to be reached yesterday, therefore a building staff member went into the apartment today, finding her on the floor. Patient found to have a CK elevated to 4671 and a troponin of 0.13. Patient admitted to cardiology/telemetry for further management of syncope and rhabdomyolysis.     CK/CKMB and trop continued to trend down with IVF running. Echocardiogram 5/18: hyperdynamic LVEF 75% w/ asymetrical LVH, no gradient. CCTA ordered to r/o ischemic causes of syncope given risk factors. 18G IV placed by cardio fellow and another one by Evangelist Beck NP. CTH and CT C/A/P negative for acute changes. Thyroid function WNL. Neurology consulted and rec 24hr v-EEG and MRI brain w/o contrast (ordered). EP consulted with plan for likely loop prior to d/c. PT rec home with home PT.    Update: 17:00 -- per RN, patient noted to be satting 89% RA. On exam, crackles at b/l bases satting 92% RA, HR 108bpm no complaints of SOB. STAT CXR w/ mild pulm vascular congestion, no effusions noted. IVF held and given 20 IV Lasix x1. Unclear how accurate Is and Os are as primafit not connected, however per nursing not urinating frequently. Bladder scan with only 40cc urine. DDimer ordered for 6pm lab draw to r/o DVT/PE w/ continued sinus tach.

## 2021-05-19 LAB
ANION GAP SERPL CALC-SCNC: 12 MMOL/L — SIGNIFICANT CHANGE UP (ref 5–17)
BUN SERPL-MCNC: 39 MG/DL — HIGH (ref 7–23)
CALCIUM SERPL-MCNC: 9 MG/DL — SIGNIFICANT CHANGE UP (ref 8.4–10.5)
CHLORIDE SERPL-SCNC: 110 MMOL/L — HIGH (ref 96–108)
CO2 SERPL-SCNC: 23 MMOL/L — SIGNIFICANT CHANGE UP (ref 22–31)
COVID-19 SPIKE DOMAIN AB INTERP: POSITIVE
COVID-19 SPIKE DOMAIN ANTIBODY RESULT: >250 U/ML — HIGH
CREAT SERPL-MCNC: 0.74 MG/DL — SIGNIFICANT CHANGE UP (ref 0.5–1.3)
CULTURE RESULTS: NO GROWTH — SIGNIFICANT CHANGE UP
GLUCOSE BLDC GLUCOMTR-MCNC: 166 MG/DL — HIGH (ref 70–99)
GLUCOSE BLDC GLUCOMTR-MCNC: 180 MG/DL — HIGH (ref 70–99)
GLUCOSE BLDC GLUCOMTR-MCNC: 181 MG/DL — HIGH (ref 70–99)
GLUCOSE BLDC GLUCOMTR-MCNC: 196 MG/DL — HIGH (ref 70–99)
GLUCOSE SERPL-MCNC: 195 MG/DL — HIGH (ref 70–99)
HCT VFR BLD CALC: 43 % — SIGNIFICANT CHANGE UP (ref 34.5–45)
HGB BLD-MCNC: 13.8 G/DL — SIGNIFICANT CHANGE UP (ref 11.5–15.5)
MAGNESIUM SERPL-MCNC: 2.1 MG/DL — SIGNIFICANT CHANGE UP (ref 1.6–2.6)
MCHC RBC-ENTMCNC: 30.3 PG — SIGNIFICANT CHANGE UP (ref 27–34)
MCHC RBC-ENTMCNC: 32.1 GM/DL — SIGNIFICANT CHANGE UP (ref 32–36)
MCV RBC AUTO: 94.3 FL — SIGNIFICANT CHANGE UP (ref 80–100)
NRBC # BLD: 0 /100 WBCS — SIGNIFICANT CHANGE UP (ref 0–0)
PLATELET # BLD AUTO: 227 K/UL — SIGNIFICANT CHANGE UP (ref 150–400)
POTASSIUM SERPL-MCNC: 3.6 MMOL/L — SIGNIFICANT CHANGE UP (ref 3.5–5.3)
POTASSIUM SERPL-SCNC: 3.6 MMOL/L — SIGNIFICANT CHANGE UP (ref 3.5–5.3)
RBC # BLD: 4.56 M/UL — SIGNIFICANT CHANGE UP (ref 3.8–5.2)
RBC # FLD: 13.3 % — SIGNIFICANT CHANGE UP (ref 10.3–14.5)
SARS-COV-2 IGG+IGM SERPL QL IA: >250 U/ML — HIGH
SARS-COV-2 IGG+IGM SERPL QL IA: POSITIVE
SODIUM SERPL-SCNC: 145 MMOL/L — SIGNIFICANT CHANGE UP (ref 135–145)
SPECIMEN SOURCE: SIGNIFICANT CHANGE UP
WBC # BLD: 8.95 K/UL — SIGNIFICANT CHANGE UP (ref 3.8–10.5)
WBC # FLD AUTO: 8.95 K/UL — SIGNIFICANT CHANGE UP (ref 3.8–10.5)

## 2021-05-19 PROCEDURE — 99232 SBSQ HOSP IP/OBS MODERATE 35: CPT

## 2021-05-19 PROCEDURE — 95720 EEG PHY/QHP EA INCR W/VEEG: CPT

## 2021-05-19 PROCEDURE — 78452 HT MUSCLE IMAGE SPECT MULT: CPT | Mod: 26

## 2021-05-19 PROCEDURE — 93880 EXTRACRANIAL BILAT STUDY: CPT | Mod: 26

## 2021-05-19 PROCEDURE — 93016 CV STRESS TEST SUPVJ ONLY: CPT

## 2021-05-19 PROCEDURE — 93970 EXTREMITY STUDY: CPT | Mod: 26

## 2021-05-19 PROCEDURE — 93018 CV STRESS TEST I&R ONLY: CPT

## 2021-05-19 PROCEDURE — 93010 ELECTROCARDIOGRAM REPORT: CPT

## 2021-05-19 PROCEDURE — 70551 MRI BRAIN STEM W/O DYE: CPT | Mod: 26

## 2021-05-19 RX ORDER — LISINOPRIL 2.5 MG/1
40 TABLET ORAL DAILY
Refills: 0 | Status: DISCONTINUED | OUTPATIENT
Start: 2021-05-19 | End: 2021-05-21

## 2021-05-19 RX ORDER — METOPROLOL TARTRATE 50 MG
25 TABLET ORAL DAILY
Refills: 0 | Status: DISCONTINUED | OUTPATIENT
Start: 2021-05-19 | End: 2021-05-21

## 2021-05-19 RX ORDER — SODIUM CHLORIDE 9 MG/ML
500 INJECTION INTRAMUSCULAR; INTRAVENOUS; SUBCUTANEOUS
Refills: 0 | Status: DISCONTINUED | OUTPATIENT
Start: 2021-05-19 | End: 2021-05-21

## 2021-05-19 RX ORDER — POTASSIUM CHLORIDE 20 MEQ
40 PACKET (EA) ORAL ONCE
Refills: 0 | Status: COMPLETED | OUTPATIENT
Start: 2021-05-19 | End: 2021-05-19

## 2021-05-19 RX ADMIN — Medication 40 MILLIEQUIVALENT(S): at 11:11

## 2021-05-19 RX ADMIN — Medication 2: at 17:48

## 2021-05-19 RX ADMIN — SODIUM CHLORIDE 50 MILLILITER(S): 9 INJECTION INTRAMUSCULAR; INTRAVENOUS; SUBCUTANEOUS at 17:27

## 2021-05-19 RX ADMIN — Medication 125 MICROGRAM(S): at 06:11

## 2021-05-19 RX ADMIN — ENOXAPARIN SODIUM 40 MILLIGRAM(S): 100 INJECTION SUBCUTANEOUS at 22:12

## 2021-05-19 RX ADMIN — SIMVASTATIN 40 MILLIGRAM(S): 20 TABLET, FILM COATED ORAL at 22:12

## 2021-05-19 RX ADMIN — AMLODIPINE BESYLATE 5 MILLIGRAM(S): 2.5 TABLET ORAL at 06:11

## 2021-05-19 RX ADMIN — Medication 2: at 07:51

## 2021-05-19 RX ADMIN — NYSTATIN CREAM 1 APPLICATION(S): 100000 CREAM TOPICAL at 19:17

## 2021-05-19 RX ADMIN — NYSTATIN CREAM 1 APPLICATION(S): 100000 CREAM TOPICAL at 06:11

## 2021-05-19 RX ADMIN — Medication 25 MILLIGRAM(S): at 19:25

## 2021-05-19 NOTE — PROGRESS NOTE ADULT - ASSESSMENT
Patient is a 75 y/o Female, former smoker (quit > 20 years ago), w/ PMHx of NIDDM, HTN, HLD, hypothyroidism, who presents s/p being found at home, on the ground, in the bathroom. Pt reports she used the bathroom and ended up on the floor without recalling how or events leading prior. Pt was last spoken to on Friday (4 days ago). Pt was unable to be reached yesterday, therefore a building staff member went into the apartment today, finding her on the floor. Patient found to have a CK elevated to 4671 and a troponin of 0.13. Patient admitted to cardiology/telemetry for further management of syncope and rhabdomyolysis.          75 y/o Female, former smoker (quit > 20 years ago), w/ PMHx of NIDDM, HTN, HLD, hypothyroidism, who presents s/p being found at home, on the ground, in the bathroom. Pt reports she used the bathroom and ended up on the floor without recalling how or events leading prior. Pt was last spoken to on Friday (4 days ago). Pt was unable to be reached yesterday, therefore a building staff member went into the apartment today, finding her on the floor. Patient found to have a CK elevated to 4671 and a troponin of 0.13. Patient admitted to cardiology/telemetry for further management of syncope and rhabdomyolysis.          75 y/o Female, former smoker (quit > 20 years ago), w/ PMHx of NIDDM, HTN, HLD, hypothyroidism, ?dementia (baseline A & O x2 -confused) who presents after being found at home on the ground after 3 days. Pt does not recall event or prodromal symptoms. On admission, pt noted to have rhabdomyolysis and elevated trop (peak 0.13) s/p IV hydration and plan for pharm NST on 5/19. Neuro following with MRI pending and EEG negative. EP recommending ILR prior to discharge. Pt continues to be sinus tach on tele and actively being ruled out for DVT/PE. PT recommending home with home PT however RN staff requesting re-eval.         75 y/o Female, former smoker (quit > 20 years ago), w/ PMHx of NIDDM, HTN, HLD, hypothyroidism, ?dementia (baseline A & O x2 -confused) who presents after being found at home on the ground after 3 days. Pt does not recall event or prodromal symptoms. On admission, pt noted to have rhabdomyolysis and elevated trop (peak 0.13) s/p IV hydration and NST on 5/19. Neuro following with MRI pending and EEG negative. EP recommending ILR prior to discharge. Pt continues to be sinus tach on tele and actively being ruled out for DVT/PE. PT recommending home with home PT however RN staff requesting re-eval.

## 2021-05-19 NOTE — PROGRESS NOTE ADULT - ASSESSMENT
76F former smoker (quit > 20 years ago), w/ PMHx of NIDDM, HTN, HLD, hypothyroidism BIBEMS s/p being found down on the ground x3days, found to have AGMA 2/2 starvation ketosis and rhabdomyolysis, currently undergoing syncopal workup. Neurology was consulted to evaluate for neurologic causes of syncope. CTH negative for any intracranial pathology. Patient denies headaches, migraines, auras, history of seizures, gait instability, dizziness, lightheadedness. Physical exam shows chronic L-sided weakness. Etiology of syncope to be determined. EEG unremarkable.    Recommendation:    - Please obtain MR Brain without contrast    Recommendations discussed with Dr. Iqbal and primary team.

## 2021-05-19 NOTE — PROGRESS NOTE ADULT - PROBLEM SELECTOR PLAN 7
F/u thyroid studies   - Continue home meds:    F: 1/2 NS at 100 cc/hr x 6 hours   E: K>4, Mg>2  N: Consistent Carb, DASH TLC  Dvt: lovenox SC  Dispo: pending clinical progression TSH WNL  - Continue home levothyroxine 125mcg QD

## 2021-05-19 NOTE — PROGRESS NOTE ADULT - PROBLEM SELECTOR PLAN 1
- Abrupt LOC while in bathroom - patient unclear what happened prior to or when she fell, but was found on the ground after about 3 days   - Head CT negative for intracranial pathology. Multiple limb xrays negative for acute fractures.   - Patient with ecchymosis on multiple areas of body.    - Troponin: 0.13 -0.010  - EKG: Sinus tachycardia at 108 bpm, poor R wave progression, PVCs   - CT abdomen/pelvis: negative for acute pathology. Lactate negative.   - UA + for ketones, leukocyte esterase, glucose, f/u urine culture   - Patient found to be severely dehydrated, unclear if dehydration occurred before or after fall   - F/u tsh, procalcitonin, blood cultures, lactate in am, bhb  - tele monitoring and echo ordered   - consider EP or neuro - Abrupt LOC while in bathroom - patient unclear what happened prior to or when she fell, but was found on the ground after about 3 days   - CTH and CT C/A/P negative UA negative, TSH/BCx negative, ECG non ischemic  - Troponin peak 0.13 (remains CP free) w/ plan for NST on 5/19 - f/u results (CCTA deferred 2/2 sinus tach)  - Echocardiogram: hyperdynamic EF 75% with asymmetric LVH and no valvular disorders   - Neuro consulted: EEG negative, MRI brain w/o contrast ordered (should get prior to ILR)  - EP consulted: plan for ILR prior to discharge  - Carotid US ordered, f/u results  - B/L LE dopplers ordered, f/u results  - PT rec: home with home PT however re-eval requested as RN staff feel she is too unsteady - Abrupt LOC while in bathroom - patient unclear what happened prior to or when she fell, but was found on the ground after about 3 days   - CTH and CT C/A/P negative UA negative, TSH/BCx negative, ECG non ischemic  - Troponin peak 0.13 (remains CP free)   - NST 5/19: small area of mild-moderate ischemia in the inferior wall. Overall left ventricular systolic function is normal without regional wall motion abnormalities. The EKG stress test is normal, NPO after midnigt for possible cath in AM   - Echocardiogram: hyperdynamic EF 75% with asymmetric LVH and no valvular disorders   - Neuro consulted: EEG negative, MRI brain w/o contrast ordered (should get prior to ILR)  - EP consulted: plan for ILR prior to discharge  - Carotid US ordered, f/u results  - B/L LE dopplers ordered, f/u results  - PT rec: home with home PT however re-eval requested as RN staff feel she is too unsteady

## 2021-05-19 NOTE — PROGRESS NOTE ADULT - SUBJECTIVE AND OBJECTIVE BOX
Interventional Cardiology PA Adult Progress Note    Subjective Assessment:  	  MEDICATIONS:  lisinopril 40 milliGRAM(s) Oral daily  metoprolol succinate ER 25 milliGRAM(s) Oral daily            dextrose 40% Gel 15 Gram(s) Oral once  dextrose 50% Injectable 25 Gram(s) IV Push once  dextrose 50% Injectable 12.5 Gram(s) IV Push once  dextrose 50% Injectable 25 Gram(s) IV Push once  glucagon  Injectable 1 milliGRAM(s) IntraMuscular once  insulin lispro (ADMELOG) corrective regimen sliding scale   SubCutaneous Before meals and at bedtime  levothyroxine 125 MICROGram(s) Oral daily  simvastatin 40 milliGRAM(s) Oral at bedtime    dextrose 5%. 1000 milliLiter(s) IV Continuous <Continuous>  dextrose 5%. 1000 milliLiter(s) IV Continuous <Continuous>  enoxaparin Injectable 40 milliGRAM(s) SubCutaneous every 24 hours  nystatin Cream 1 Application(s) Topical two times a day  sodium chloride 0.9%. 500 milliLiter(s) IV Continuous <Continuous>      	    [PHYSICAL EXAM:  TELEMETRY:  T(C): 36.1 (05-19-21 @ 10:00), Max: 37 (05-18-21 @ 21:58)  HR: 91 (05-19-21 @ 11:43) (90 - 120)  BP: 155/70 (05-19-21 @ 11:43) (146/64 - 188/77)  RR: 18 (05-19-21 @ 11:43) (18 - 18)  SpO2: 93% (05-19-21 @ 11:43) (90% - 96%)  Wt(kg): --  I&O's Summary    18 May 2021 07:01  -  19 May 2021 07:00  --------------------------------------------------------  IN: 735 mL / OUT: 952 mL / NET: -217 mL    19 May 2021 07:01  -  19 May 2021 11:55  --------------------------------------------------------  IN: 0 mL / OUT: 0 mL / NET: 0 mL        Sommers:  Central/PICC/Mid Line:                                         Appearance: Normal	  HEENT:   Normal oral mucosa, PERRL, EOMI	  Neck: Supple, + JVD/ - JVD; Carotid Bruit   Cardiovascular: Normal S1 S2, No JVD, No murmurs,   Respiratory: Lungs clear to auscultation/Decreased Breath Sounds/No Rales, Rhonchi, Wheezing	  Gastrointestinal:  Soft, Non-tender, + BS	  Skin: No rashes, No ecchymoses, No cyanosis  Extremities: Normal range of motion, No clubbing, cyanosis or edema  Vascular: Peripheral pulses palpable 2+ bilaterally  Neurologic: Non-focal  Psychiatry: A & O x 3, Mood & affect appropriate      	    ECG:  	  RADIOLOGY:   DIAGNOSTIC TESTING:  [ ] Echocardiogram:  [ ]  Catheterization:  [ ] Stress Test:    [ ] BENY  OTHER: 	    LABS:	 	  CARDIAC MARKERS:                                  13.8   8.95  )-----------( 227      ( 19 May 2021 06:59 )             43.0     05-19    145  |  110<H>  |  39<H>  ----------------------------<  195<H>  3.6   |  23  |  0.74    Ca    9.0      19 May 2021 06:59  Phos  3.1     05-18  Mg     2.1     05-19    TPro  6.4  /  Alb  3.1<L>  /  TBili  0.4  /  DBili  x   /  AST  69<H>  /  ALT  46<H>  /  AlkPhos  59  05-18    proBNP:   Lipid Profile:   HgA1c:   TSH:   PTT - ( 18 May 2021 05:51 )  PTT:26.0 sec    ASSESSMENT/PLAN: 	        DVT ppx:  Dispo:     Interventional Cardiology PA Adult Progress Note    Subjective Assessment: Patient seen and examined at the bedside. No complaints of SOB/CP however stating she would like to go home if she doesn't speak with the doctor. After discussion with Dr. Rubin, patient more willing to stay for evaluation and it was determined patient CANNOT sign out AMA/doesn't have capacity as it appears she has dementia (A & O x2 at baseline per nephew). Nephew (HCP) wants her to stay for evaluation. All other ROS negative except those listed in subjective assessment.   	  MEDICATIONS:  lisinopril 40 milliGRAM(s) Oral daily  metoprolol succinate ER 25 milliGRAM(s) Oral daily  dextrose 40% Gel 15 Gram(s) Oral once  dextrose 50% Injectable 25 Gram(s) IV Push once  dextrose 50% Injectable 12.5 Gram(s) IV Push once  dextrose 50% Injectable 25 Gram(s) IV Push once  glucagon  Injectable 1 milliGRAM(s) IntraMuscular once  insulin lispro (ADMELOG) corrective regimen sliding scale   SubCutaneous Before meals and at bedtime  levothyroxine 125 MICROGram(s) Oral daily  simvastatin 40 milliGRAM(s) Oral at bedtime  dextrose 5%. 1000 milliLiter(s) IV Continuous <Continuous>  dextrose 5%. 1000 milliLiter(s) IV Continuous <Continuous>  enoxaparin Injectable 40 milliGRAM(s) SubCutaneous every 24 hours  nystatin Cream 1 Application(s) Topical two times a day  sodium chloride 0.9%. 500 milliLiter(s) IV Continuous <Continuous>    [PHYSICAL EXAM:  TELEMETRY:  T(C): 36.1 (05-19-21 @ 10:00), Max: 37 (05-18-21 @ 21:58)  HR: 91 (05-19-21 @ 11:43) (90 - 120)  BP: 155/70 (05-19-21 @ 11:43) (146/64 - 188/77)  RR: 18 (05-19-21 @ 11:43) (18 - 18)  SpO2: 93% (05-19-21 @ 11:43) (90% - 96%)  I&O's Summary    18 May 2021 07:01  -  19 May 2021 07:00  --------------------------------------------------------  IN: 735 mL / OUT: 952 mL / NET: -217 mL    19 May 2021 07:01  -  19 May 2021 11:55  --------------------------------------------------------  IN: 0 mL / OUT: 0 mL / NET: 0 mL                               Appearance: Normal		  Neck: Supple,- JVD; no Carotid Bruit b/l  Cardiovascular: Normal S1 S2, No murmurs, rubs, gallops  Respiratory: Lungs clear to auscultation/No Rales, Rhonchi, Wheezing	  Gastrointestinal:  Soft, Non-tender, ND, + BS x4	  Extremities: Normal range of motion, No clubbing, cyanosis. Trace edema b/l   Vascular: Peripheral pulses palpable 2+ bilaterally carotids, radial, femoral, 1+ b/l DP/PT  Neurologic:  A & O x 2 (forgetfull), Mood & affect appropriate  	    ECG:  	  RADIOLOGY:   DIAGNOSTIC TESTING:  [x ] Echocardiogram: < from: TTE Echo Complete w/o Contrast w/ Doppler (05.18.21 @ 10:42) >  CONCLUSIONS:     1. Mitral annular calcification noted. The mean transvalvular gradient is 5.00 mmHg at a heart rate of 96 bpm. There is trace mitral regurgitation.   2. No significant valvular disease.   3. The right ventricle is normal in size. Right ventricular systolic function is normal.   4. There is asymmetric left ventricular hypertrophy. Left ventricular systolic function is hyperdynamic with a calculated ejection fraction of >75%.   5. No pericardial effusion.   6. No prior echo is available for comparison.    < end of copied text >    [ ]  Catheterization:  [ ] Stress Test:    [ ] BENY  OTHER: 	    LABS:	 	  CARDIAC MARKERS:                     13.8   8.95  )-----------( 227      ( 19 May 2021 06:59 )             43.0     05-19    145  |  110<H>  |  39<H>  ----------------------------<  195<H>  3.6   |  23  |  0.74    Ca    9.0      19 May 2021 06:59  Phos  3.1     05-18  Mg     2.1     05-19    TPro  6.4  /  Alb  3.1<L>  /  TBili  0.4  /  DBili  x   /  AST  69<H>  /  ALT  46<H>  /  AlkPhos  59  05-18  PTT - ( 18 May 2021 05:51 )  PTT:26.0 sec

## 2021-05-19 NOTE — EEG REPORT - NS EEG TEXT BOX
[facility] Department of Neurology  Inpatient Continuous video-Electroencephalogram    Acquisition Details:  Electroencephalography was acquired using a minimum of 21 channels on an [employer] system v 8.5.1 with electrode placement according to the standard International 10-20 system following ACNS (American Clinical Neurophysiology Society) guidelines for Long-Term Video EEG monitoring.  Anterior temporal T1 and T2 electrodes were utilized whenever possible.   The [employer]K automated spike & seizure detections were all reviewed in detail, in addition to extensive portions of raw EEG.    Day 1: 5/18/2021 @ 5:38:33 PM to next morning @ 07:00 am  Background:  continuous, with predominantly alpha and beta frequencies.  Symmetry:  No persistent asymmetries of voltage or frequency.  Posterior Dominant Rhythm:  9 Hz symmetric, well-organized, and well-modulated.  Organization: Rudimentary.  Voltage:  Normal (20+ uV)  Variability: Yes. 		Reactivity: Yes.  N2 sleep: Symmetric, synchronous spindles and K complexes.  Spontaneous Activity:  No epileptiform discharges.  Periodic/rhythmic activity:  None.  Events:  No electrographic seizures or significant clinical events.  Provocations:  Hyperventilation and Photic stimulation: was not performed.    Daily Summary:    Mild generalized slowing suggestive of a similar degree of diffuse or multifocal  dysfunction.  No epileptiform activity and no significant clinical events occurred.

## 2021-05-19 NOTE — PROGRESS NOTE ADULT - PROBLEM SELECTOR PLAN 3
Patient severely dehydrated on admission   - Continue to measure labs as above  - continue IV fluids -Continues to clinically appear dehydrated with HR in 90s-100s and subjectively very thirsty   -Per Dr. Rubin, placed back on IVF @ 50cc/hr for hydration   -OF NOTE: on 5/18 PM noted to be crackly on exam and IVF stopped overnight. Received 20mg IV Lasix on 5/18 and now euvolemic.     #Urinary Retention  -On 5/19 noted to be retaining 650cc urine s/p straight cath x1  -F/u repeat bladder scan  -Continue primafit for monitoring

## 2021-05-19 NOTE — PROGRESS NOTE ADULT - PROBLEM SELECTOR PLAN 2
Patient found to be on floor for possibly 4 days s/p fall vs. syncope   - CPK: 4671-> 3787, BUN/CR: 61/1.23, AST/ALT: 96/55, WBC elevated to 14.59, Hgb/Hct: 16.1/49.7  - UA with moderate bilirubin, ketones, and protein   - Patient given NS 1000 ml bolus x 2 in ED  - Trend CPK, CMP, CBC, BHB, anion gap   - continue fluids - 1/2 NS at 100 cc/hr x 6 hours Patient found to be on floor for possibly 4 days s/p fall vs. syncope   - CPK: 4671-> 3787, continues to downtrend  - Patient given NS 1000 ml bolus x 2 in ED and subsequent maintenance IVF on admission

## 2021-05-19 NOTE — PROGRESS NOTE ADULT - PROBLEM SELECTOR PLAN 5
Hx of HTN, on home meds: Quinapril 40 mg once daily   - Holding ACE given CARLOS -SBPs noted to be 170s-180s when holding home Quinapril 40mg QD  -CONT: Lisinopril 40mg QD (therapeutic IC Quinapril) and new Toprol XL 25mg QD

## 2021-05-19 NOTE — PROGRESS NOTE ADULT - PROBLEM SELECTOR PLAN 4
Hx of diabetes; found with a glucose of >300 and anion gap, likely elevated due to dehydration as patient's glucose improved with IV fluids   - F/u a1c  - Home meds: Invokana 300, Glimeperide 2, Metformin 1000 mg BID   - Continue MISS  - F/u beta hydroxybutate, anion gap in am Initially found with a glucose of >300 and anion gap, likely elevated due to dehydration as patient's glucose improved with IV fluids   - A1c 6.0  - Holding home meds: Invokana 300, Glimepiride 2, Metformin 1000 mg BID   - Continue MISS

## 2021-05-20 LAB
ANION GAP SERPL CALC-SCNC: 10 MMOL/L — SIGNIFICANT CHANGE UP (ref 5–17)
BUN SERPL-MCNC: 29 MG/DL — HIGH (ref 7–23)
CALCIUM SERPL-MCNC: 8.4 MG/DL — SIGNIFICANT CHANGE UP (ref 8.4–10.5)
CHLORIDE SERPL-SCNC: 110 MMOL/L — HIGH (ref 96–108)
CO2 SERPL-SCNC: 22 MMOL/L — SIGNIFICANT CHANGE UP (ref 22–31)
CREAT SERPL-MCNC: 0.59 MG/DL — SIGNIFICANT CHANGE UP (ref 0.5–1.3)
GLUCOSE BLDC GLUCOMTR-MCNC: 125 MG/DL — HIGH (ref 70–99)
GLUCOSE BLDC GLUCOMTR-MCNC: 153 MG/DL — HIGH (ref 70–99)
GLUCOSE BLDC GLUCOMTR-MCNC: 167 MG/DL — HIGH (ref 70–99)
GLUCOSE BLDC GLUCOMTR-MCNC: 186 MG/DL — HIGH (ref 70–99)
GLUCOSE SERPL-MCNC: 159 MG/DL — HIGH (ref 70–99)
HCT VFR BLD CALC: 40.6 % — SIGNIFICANT CHANGE UP (ref 34.5–45)
HGB BLD-MCNC: 13.1 G/DL — SIGNIFICANT CHANGE UP (ref 11.5–15.5)
MAGNESIUM SERPL-MCNC: 1.9 MG/DL — SIGNIFICANT CHANGE UP (ref 1.6–2.6)
MCHC RBC-ENTMCNC: 30.4 PG — SIGNIFICANT CHANGE UP (ref 27–34)
MCHC RBC-ENTMCNC: 32.3 GM/DL — SIGNIFICANT CHANGE UP (ref 32–36)
MCV RBC AUTO: 94.2 FL — SIGNIFICANT CHANGE UP (ref 80–100)
NRBC # BLD: 0 /100 WBCS — SIGNIFICANT CHANGE UP (ref 0–0)
PLATELET # BLD AUTO: 179 K/UL — SIGNIFICANT CHANGE UP (ref 150–400)
POTASSIUM SERPL-MCNC: 4 MMOL/L — SIGNIFICANT CHANGE UP (ref 3.5–5.3)
POTASSIUM SERPL-SCNC: 4 MMOL/L — SIGNIFICANT CHANGE UP (ref 3.5–5.3)
RBC # BLD: 4.31 M/UL — SIGNIFICANT CHANGE UP (ref 3.8–5.2)
RBC # FLD: 12.9 % — SIGNIFICANT CHANGE UP (ref 10.3–14.5)
SODIUM SERPL-SCNC: 142 MMOL/L — SIGNIFICANT CHANGE UP (ref 135–145)
WBC # BLD: 6.33 K/UL — SIGNIFICANT CHANGE UP (ref 3.8–10.5)
WBC # FLD AUTO: 6.33 K/UL — SIGNIFICANT CHANGE UP (ref 3.8–10.5)

## 2021-05-20 PROCEDURE — 99232 SBSQ HOSP IP/OBS MODERATE 35: CPT

## 2021-05-20 PROCEDURE — 99152 MOD SED SAME PHYS/QHP 5/>YRS: CPT

## 2021-05-20 PROCEDURE — 93454 CORONARY ARTERY ANGIO S&I: CPT | Mod: 26

## 2021-05-20 RX ORDER — SODIUM CHLORIDE 9 MG/ML
500 INJECTION INTRAMUSCULAR; INTRAVENOUS; SUBCUTANEOUS
Refills: 0 | Status: DISCONTINUED | OUTPATIENT
Start: 2021-05-20 | End: 2021-05-21

## 2021-05-20 RX ORDER — MAGNESIUM OXIDE 400 MG ORAL TABLET 241.3 MG
400 TABLET ORAL ONCE
Refills: 0 | Status: COMPLETED | OUTPATIENT
Start: 2021-05-20 | End: 2021-05-20

## 2021-05-20 RX ORDER — AMLODIPINE BESYLATE 2.5 MG/1
5 TABLET ORAL DAILY
Refills: 0 | Status: DISCONTINUED | OUTPATIENT
Start: 2021-05-20 | End: 2021-05-21

## 2021-05-20 RX ORDER — ASPIRIN/CALCIUM CARB/MAGNESIUM 324 MG
325 TABLET ORAL ONCE
Refills: 0 | Status: COMPLETED | OUTPATIENT
Start: 2021-05-20 | End: 2021-05-20

## 2021-05-20 RX ORDER — CLOPIDOGREL BISULFATE 75 MG/1
600 TABLET, FILM COATED ORAL ONCE
Refills: 0 | Status: COMPLETED | OUTPATIENT
Start: 2021-05-20 | End: 2021-05-20

## 2021-05-20 RX ADMIN — ENOXAPARIN SODIUM 40 MILLIGRAM(S): 100 INJECTION SUBCUTANEOUS at 22:13

## 2021-05-20 RX ADMIN — SODIUM CHLORIDE 75 MILLILITER(S): 9 INJECTION INTRAMUSCULAR; INTRAVENOUS; SUBCUTANEOUS at 12:27

## 2021-05-20 RX ADMIN — LISINOPRIL 40 MILLIGRAM(S): 2.5 TABLET ORAL at 05:15

## 2021-05-20 RX ADMIN — NYSTATIN CREAM 1 APPLICATION(S): 100000 CREAM TOPICAL at 05:15

## 2021-05-20 RX ADMIN — Medication 2: at 07:40

## 2021-05-20 RX ADMIN — AMLODIPINE BESYLATE 5 MILLIGRAM(S): 2.5 TABLET ORAL at 18:33

## 2021-05-20 RX ADMIN — Medication 25 MILLIGRAM(S): at 05:15

## 2021-05-20 RX ADMIN — Medication 325 MILLIGRAM(S): at 12:28

## 2021-05-20 RX ADMIN — NYSTATIN CREAM 1 APPLICATION(S): 100000 CREAM TOPICAL at 18:33

## 2021-05-20 RX ADMIN — Medication 125 MICROGRAM(S): at 05:15

## 2021-05-20 RX ADMIN — CLOPIDOGREL BISULFATE 600 MILLIGRAM(S): 75 TABLET, FILM COATED ORAL at 12:28

## 2021-05-20 RX ADMIN — MAGNESIUM OXIDE 400 MG ORAL TABLET 400 MILLIGRAM(S): 241.3 TABLET ORAL at 12:28

## 2021-05-20 RX ADMIN — Medication 2: at 22:14

## 2021-05-20 RX ADMIN — SIMVASTATIN 40 MILLIGRAM(S): 20 TABLET, FILM COATED ORAL at 22:13

## 2021-05-20 RX ADMIN — Medication 2: at 12:28

## 2021-05-20 NOTE — PROGRESS NOTE ADULT - PROBLEM SELECTOR PLAN 4
Initially found with a glucose of >300 and anion gap, likely elevated due to dehydration as patient's glucose improved with IV fluids   - A1c 6.0  - Holding home meds: Invokana 300, Glimepiride 2, Metformin 1000 mg BID   - Continue MISS A1c 6.0  - Holding home meds: Invokana 300, Glimepiride 2, Metformin 1000 mg BID   - Continue MISS

## 2021-05-20 NOTE — PROGRESS NOTE ADULT - PROBLEM SELECTOR PLAN 3
-Continues to clinically appear dehydrated with HR in 90s-100s and subjectively very thirsty   -Per Dr. Rubin, placed back on IVF @ 50cc/hr for hydration   -OF NOTE: on 5/18 PM noted to be crackly on exam and IVF stopped overnight. Received 20mg IV Lasix on 5/18 and now euvolemic.     #Urinary Retention  -On 5/19 noted to be retaining 650cc urine s/p straight cath x1  -F/u repeat bladder scan  -Continue primafit for monitoring -Continues to clinically appear dehydrated with HR in 90s-100s and subjectively very thirsty   -Maintenance IVF on admission

## 2021-05-20 NOTE — PROGRESS NOTE ADULT - PROBLEM SELECTOR PLAN 1
- Abrupt LOC while in bathroom - patient unclear what happened prior to or when she fell, but was found on the ground after about 3 days   - CTH and CT C/A/P negative UA negative, TSH/BCx negative, ECG non ischemic  - Troponin peak 0.13 (remains CP free)   - NST 5/19: small area of mild-moderate ischemia in the inferior wall. Overall left ventricular systolic function is normal without regional wall motion abnormalities. The EKG stress test is normal, NPO after midnigt for possible cath in AM   - Echocardiogram: hyperdynamic EF 75% with asymmetric LVH and no valvular disorders   - Neuro consulted: EEG negative, MRI brain w/o contrast ordered (should get prior to ILR)  - EP consulted: plan for ILR prior to discharge  - Carotid US ordered, f/u results  - B/L LE dopplers ordered, f/u results  - PT rec: home with home PT however re-eval requested as RN staff feel she is too unsteady - Abrupt LOC while in bathroom - patient unclear what happened prior to or when she fell, but was found on the ground after about 3 days   - CTH and CT C/A/P negative UA negative, TSH/BCx negative, ECG non ischemic  - NST 5/19: small area of mild-moderate ischemia in the inferior wall. NPO for cardiac cath 5/20, f/u results (ASA/Plavix loaded)  - Echocardiogram 5/18: hyperdynamic EF 75% with asymmetric LVH and no valvular disorders   - Neuro consulted: EEG negative, MRI brain w/o contrast negative. Neuro signed off. Should follow up w/ neuro outpatient (attending that was following)  - EP consulted: plan for ILR prior to discharge  - Carotid US negative  - B/L LE dopplers negative   - PT rec: home with home PT, f/u re-eval

## 2021-05-20 NOTE — PROGRESS NOTE ADULT - PROBLEM SELECTOR PLAN 5
-SBPs noted to be 170s-180s when holding home Quinapril 40mg QD  -CONT: Lisinopril 40mg QD (therapeutic IC Quinapril) and new Toprol XL 25mg QD -CONT: Lisinopril 40mg QD (therapeutic IC Quinapril) and new Toprol XL 25mg QD

## 2021-05-20 NOTE — PROGRESS NOTE ADULT - ATTENDING COMMENTS
as above
Syncope/LOC  Non obstructive LHC, No apparent etiology of syncope,   ?Hyperdynamic LV (?dehydration)  -Loop recorder

## 2021-05-20 NOTE — PROGRESS NOTE ADULT - PROBLEM SELECTOR PLAN 6
LDL 81  - CONT: home Simvastatin 40 mg QD LDL 81  - CONT: home Simvastatin 40 mg QD -- consider increasing pending cath results

## 2021-05-20 NOTE — PROGRESS NOTE ADULT - ASSESSMENT
75 y/o Female, former smoker (quit > 20 years ago), w/ PMHx of NIDDM, HTN, HLD, hypothyroidism, ?dementia (baseline A & O x2 -confused) who presents after being found at home on the ground after 3 days. Pt does not recall event or prodromal symptoms. On admission, pt noted to have rhabdomyolysis and elevated trop (peak 0.13) s/p IV hydration and NST on 5/19. Neuro following with MRI pending and EEG negative. EP recommending ILR prior to discharge. Pt continues to be sinus tach on tele and actively being ruled out for DVT/PE. PT recommending home with home PT however RN staff requesting re-eval.         75 y/o Female, former smoker (quit > 20 years ago), w/ PMHx of NIDDM, HTN, HLD, hypothyroidism, ?dementia (baseline A & O x2 - confused) who presents after being found at home on the ground after 3 days. Pt does not recall event or prodromal symptoms. On admission, pt noted to have rhabdomyolysis and elevated trop (peak 0.13) s/p IV hydration with Neuro/EP following. MRI Head/EEG negative, neuro signed off. EP  and w/ NST (+) for inferior ischemia. Neuro following with MRI pending and EEG negative. EP recommending ILR prior to discharge. Pt continues to be sinus tach on tele and actively being ruled out for DVT/PE. PT recommending home with home PT however RN staff requesting re-eval.         77 y/o Female, former smoker (quit > 20 years ago), w/ PMHx of NIDDM, HTN, HLD, hypothyroidism, ?dementia (baseline A & O x2 - confused) who presents after being found at home on the ground after 3 days. Pt does not recall event or prodromal symptoms. On admission, pt noted to have rhabdomyolysis and elevated trop (peak 0.13) s/p IV hydration with Neuro/EP following. MRI Head/EEG negative, neuro signed off. EP plans to place ILR prior to d/c. NST (+) for inferior ischemia. NPO for cardiac cath 5/20 w/ Dr. Rubin. Discharge pending cath/repeat PT eval.

## 2021-05-20 NOTE — PROGRESS NOTE ADULT - ASSESSMENT
76F former smoker (quit > 20 years ago), w/ PMHx of NIDDM, HTN, HLD, hypothyroidism BIBEMS s/p being found down on the ground x3days, found to have AGMA 2/2 starvation ketosis and rhabdomyolysis, currently undergoing syncopal workup. Neurology was consulted to evaluate for neurologic causes of syncope. CTH negative for any intracranial pathology. Patient denies headaches, migraines, auras, history of seizures, gait instability, dizziness, lightheadedness. Physical exam shows chronic L-sided weakness. Etiology of syncope to be determined. EEG unremarkable. Brain MR showing no acute pathology, so syncope is unlikely due to neurological disease    Recommendation:    - No further neurologic workup at this time    Recommendations discussed with Dr. Iqbal and primary team. Neurology will sign off, please re-consult as needed. 76F former smoker (quit > 20 years ago), w/ PMHx of NIDDM, HTN, HLD, hypothyroidism BIBEMS s/p being found down on the ground x3days, found to have AGMA 2/2 starvation ketosis and rhabdomyolysis, currently undergoing syncopal workup. Neurology was consulted to evaluate for neurologic causes of syncope. CTH negative for any intracranial pathology. Patient denies headaches, migraines, auras, history of seizures, gait instability, dizziness, lightheadedness. Physical exam shows chronic L-sided weakness. Etiology of syncope to be determined. EEG unremarkable. Brain MR showing no acute pathology    Recommendation:    - No further neurologic workup at this time    Recommendations discussed with Dr. Iqbal and primary team. Neurology will sign off, please re-consult as needed.

## 2021-05-20 NOTE — PROGRESS NOTE ADULT - TIME BILLING
counseling/coordination of care
Syncope/LOC  Non obstructive LHC, No apparent etiology of syncope,   ?Hyperdynamic LV (?dehydration)  -Loop recorder

## 2021-05-20 NOTE — PROGRESS NOTE ADULT - SUBJECTIVE AND OBJECTIVE BOX
INTERVAL HPI/OVERNIGHT EVENTS:  MR Brain performed with no acute pathology. Some evidence of microvascular disease  SUBJECTIVE: Patient seen and examined at bedside. No acute complaints besides being thirsty and wanting more ice.     VITAL SIGNS:  ICU Vital Signs Last 24 Hrs  T(C): 36.3 (20 May 2021 09:14), Max: 36.8 (19 May 2021 22:38)  T(F): 97.3 (20 May 2021 09:14), Max: 98.3 (19 May 2021 22:38)  HR: 78 (20 May 2021 08:48) (78 - 112)  BP: 159/65 (20 May 2021 08:48) (148/69 - 159/65)  BP(mean): --  ABP: --  ABP(mean): --  RR: 18 (20 May 2021 08:48) (18 - 18)  SpO2: 93% (20 May 2021 08:48) (93% - 98%)        05-19 @ 07:01  -  05-20 @ 07:00  --------------------------------------------------------  IN: 100 mL / OUT: 550 mL / NET: -450 mL      CAPILLARY BLOOD GLUCOSE      POCT Blood Glucose.: 167 mg/dL (20 May 2021 06:47)      PHYSICAL EXAM:    Physical exam:  General: No acute distress, awake and alert, labile mood  Extremities: Radial and DP pulses +2, no edema, scrapes and bruises evident on elbows and knees, worse on LLE    Neurologic:  -Mental status: Awake, alert, oriented to person, place, and time. Speech is fluent with intact naming, repetition, and comprehension, no dysarthria. Recent and remote memory intact. Follows commands. Attention/concentration variable as pt fixated on getting ice. Fund of knowledge appropriate.  -Cranial nerves:   II: Visual fields are full to confrontation.  III, IV, VI: Extraocular movements are intact without nystagmus. Pupils equally round and reactive to light (2.5mm)  V:  Facial sensation V1-V3 equal and intact   VII: Face is symmetric with normal eye closure and smile  VIII: Hearing is bilaterally intact to finger rub  IX, X: Uvula is midline and soft palate rises symmetrically  XI: Head turning and shoulder shrug are intact.  XII: Tongue protrudes midline  Motor: Normal bulk and tone. No pronator drift. Strength R>L (RUE 5/5, LUE 4+/5, RLE 5/5, LLE 4/5)  Rapid alternating movements intact and symmetric  Sensation: Intact to light touch bilaterally. No neglect or extinction on double simultaneous testing.  Coordination: No dysmetria on finger-to-nose and heel-to-shin bilaterally  Reflexes: Downgoing toes bilaterally   Gait: not assessed      MEDICATIONS:  MEDICATIONS  (STANDING):  dextrose 40% Gel 15 Gram(s) Oral once  dextrose 5%. 1000 milliLiter(s) (50 mL/Hr) IV Continuous <Continuous>  dextrose 5%. 1000 milliLiter(s) (100 mL/Hr) IV Continuous <Continuous>  dextrose 50% Injectable 25 Gram(s) IV Push once  dextrose 50% Injectable 12.5 Gram(s) IV Push once  dextrose 50% Injectable 25 Gram(s) IV Push once  enoxaparin Injectable 40 milliGRAM(s) SubCutaneous every 24 hours  glucagon  Injectable 1 milliGRAM(s) IntraMuscular once  insulin lispro (ADMELOG) corrective regimen sliding scale   SubCutaneous Before meals and at bedtime  levothyroxine 125 MICROGram(s) Oral daily  lisinopril 40 milliGRAM(s) Oral daily  magnesium oxide 400 milliGRAM(s) Oral once  metoprolol succinate ER 25 milliGRAM(s) Oral daily  nystatin Cream 1 Application(s) Topical two times a day  simvastatin 40 milliGRAM(s) Oral at bedtime  sodium chloride 0.9%. 500 milliLiter(s) (50 mL/Hr) IV Continuous <Continuous>    MEDICATIONS  (PRN):      ALLERGIES:  Allergies    No Known Allergies    Intolerances        LABS:                        13.1   6.33  )-----------( 179      ( 20 May 2021 07:48 )             40.6     05-20    142  |  110<H>  |  29<H>  ----------------------------<  159<H>  4.0   |  22  |  0.59    Ca    8.4      20 May 2021 07:48  Mg     1.9     05-20            RADIOLOGY & ADDITIONAL TESTS: Reviewed.

## 2021-05-20 NOTE — PROGRESS NOTE ADULT - PROBLEM SELECTOR PLAN 2
Patient found to be on floor for possibly 4 days s/p fall vs. syncope   - CPK: 4671-> 3787, continues to downtrend  - Patient given NS 1000 ml bolus x 2 in ED and subsequent maintenance IVF on admission Patient found to be on floor for possibly 4 days s/p fall vs. syncope -- RESOLVED  -Maintenance IVF on admission

## 2021-05-20 NOTE — PROGRESS NOTE ADULT - SUBJECTIVE AND OBJECTIVE BOX
Interventional Cardiology PA Adult Progress Note    Subjective Assessment:  	  MEDICATIONS:  lisinopril 40 milliGRAM(s) Oral daily  metoprolol succinate ER 25 milliGRAM(s) Oral daily            dextrose 40% Gel 15 Gram(s) Oral once  dextrose 50% Injectable 25 Gram(s) IV Push once  dextrose 50% Injectable 12.5 Gram(s) IV Push once  dextrose 50% Injectable 25 Gram(s) IV Push once  glucagon  Injectable 1 milliGRAM(s) IntraMuscular once  insulin lispro (ADMELOG) corrective regimen sliding scale   SubCutaneous Before meals and at bedtime  levothyroxine 125 MICROGram(s) Oral daily  simvastatin 40 milliGRAM(s) Oral at bedtime    dextrose 5%. 1000 milliLiter(s) IV Continuous <Continuous>  dextrose 5%. 1000 milliLiter(s) IV Continuous <Continuous>  enoxaparin Injectable 40 milliGRAM(s) SubCutaneous every 24 hours  nystatin Cream 1 Application(s) Topical two times a day  sodium chloride 0.9%. 500 milliLiter(s) IV Continuous <Continuous>  sodium chloride 0.9%. 500 milliLiter(s) IV Continuous <Continuous>      	    [PHYSICAL EXAM:  TELEMETRY:  T(C): 36.3 (05-20-21 @ 09:14), Max: 36.8 (05-19-21 @ 22:38)  HR: 83 (05-20-21 @ 12:24) (78 - 112)  BP: 150/66 (05-20-21 @ 12:24) (139/65 - 170/72)  RR: 18 (05-20-21 @ 12:24) (18 - 18)  SpO2: 95% (05-20-21 @ 12:24) (93% - 98%)  Wt(kg): --  I&O's Summary    19 May 2021 07:01  -  20 May 2021 07:00  --------------------------------------------------------  IN: 100 mL / OUT: 550 mL / NET: -450 mL        Sommers:  Central/PICC/Mid Line:                                         Appearance: Normal	  HEENT:   Normal oral mucosa, PERRL, EOMI	  Neck: Supple, + JVD/ - JVD; Carotid Bruit   Cardiovascular: Normal S1 S2, No JVD, No murmurs,   Respiratory: Lungs clear to auscultation/Decreased Breath Sounds/No Rales, Rhonchi, Wheezing	  Gastrointestinal:  Soft, Non-tender, + BS	  Skin: No rashes, No ecchymoses, No cyanosis  Extremities: Normal range of motion, No clubbing, cyanosis or edema  Vascular: Peripheral pulses palpable 2+ bilaterally  Neurologic: Non-focal  Psychiatry: A & O x 3, Mood & affect appropriate      	    ECG:  	  RADIOLOGY:   DIAGNOSTIC TESTING:  [ ] Echocardiogram:  [ ]  Catheterization:  [ ] Stress Test:    [ ] BENY  OTHER: 	    LABS:	 	  CARDIAC MARKERS:                                  13.1   6.33  )-----------( 179      ( 20 May 2021 07:48 )             40.6     05-20    142  |  110<H>  |  29<H>  ----------------------------<  159<H>  4.0   |  22  |  0.59    Ca    8.4      20 May 2021 07:48  Mg     1.9     05-20      proBNP:   Lipid Profile:   HgA1c:   TSH:       ASSESSMENT/PLAN: 	        DVT ppx:  Dispo:     Interventional Cardiology PA Adult Progress Note    Subjective Assessment: Patient seen and examined at the bedside. No complaints at this time. Aware of plan for cardiac catheterisation today. All other ROS negative except those listed in subjective assessment.   	  MEDICATIONS:  lisinopril 40 milliGRAM(s) Oral daily  metoprolol succinate ER 25 milliGRAM(s) Oral daily  dextrose 40% Gel 15 Gram(s) Oral once  dextrose 50% Injectable 25 Gram(s) IV Push once  dextrose 50% Injectable 12.5 Gram(s) IV Push once  dextrose 50% Injectable 25 Gram(s) IV Push once  glucagon  Injectable 1 milliGRAM(s) IntraMuscular once  insulin lispro (ADMELOG) corrective regimen sliding scale   SubCutaneous Before meals and at bedtime  levothyroxine 125 MICROGram(s) Oral daily  simvastatin 40 milliGRAM(s) Oral at bedtime  dextrose 5%. 1000 milliLiter(s) IV Continuous <Continuous>  dextrose 5%. 1000 milliLiter(s) IV Continuous <Continuous>  enoxaparin Injectable 40 milliGRAM(s) SubCutaneous every 24 hours  nystatin Cream 1 Application(s) Topical two times a day  sodium chloride 0.9%. 500 milliLiter(s) IV Continuous <Continuous>  sodium chloride 0.9%. 500 milliLiter(s) IV Continuous <Continuous>    [PHYSICAL EXAM:  TELEMETRY:  T(C): 36.3 (05-20-21 @ 09:14), Max: 36.8 (05-19-21 @ 22:38)  HR: 83 (05-20-21 @ 12:24) (78 - 112)  BP: 150/66 (05-20-21 @ 12:24) (139/65 - 170/72)  RR: 18 (05-20-21 @ 12:24) (18 - 18)  SpO2: 95% (05-20-21 @ 12:24) (93% - 98%  I&O's Summary    19 May 2021 07:01  -  20 May 2021 07:00  --------------------------------------------------------  IN: 100 mL / OUT: 550 mL / NET: -450 mL                                  Appearance: Normal		  Neck: Supple,- JVD; no Carotid Bruit b/l  Cardiovascular: Normal S1 S2, No murmurs, rubs, gallops  Respiratory: Lungs clear to auscultation/No Rales, Rhonchi, Wheezing	  Gastrointestinal:  Soft, Non-tender, ND, + BS x4	  Extremities: Normal range of motion, No clubbing, cyanosis. Trace edema b/l   Vascular: Peripheral pulses palpable 2+ bilaterally carotids, radial, femoral, 1+ b/l DP/PT  Neurologic:  A & O x 2 forgetful Mood & affect appropriate  	    ECG:  	  RADIOLOGY:   DIAGNOSTIC TESTING:  [ x] Echocardiogram: < from: TTE Echo Complete w/o Contrast w/ Doppler (05.18.21 @ 10:42) >  CONCLUSIONS:     1. Mitral annular calcification noted. The mean transvalvular gradient is 5.00 mmHg at a heart rate of 96 bpm. There is trace mitral regurgitation.   2. No significant valvular disease.   3. The right ventricle is normal in size. Right ventricular systolic function is normal.   4. There is asymmetric left ventricular hypertrophy. Left ventricular systolic function is hyperdynamic with a calculated ejection fraction of >75%.   5. No pericardial effusion.   6. No prior echo is available for comparison.    < end of copied text >    [ ]  Catheterization:  [x ] Stress Test:   < from: Pharm Thallium Stress (Lexiscan) -LEXMIB (05.19.21 @ 16:22) >  Myocardial perfusion imaging is abnormal. There is a small area of mild-moderate ischemia in the inferior wall. Overall left ventricular systolic function is normal without regional wall motion abnormalities. The EKG stress test is normal, however, a blunted physiological response to Regadenoson was noted.    < end of copied text >    [ ] BENY  OTHER: 	    LABS:	 	  CARDIAC MARKERS:                        13.1   6.33  )-----------( 179      ( 20 May 2021 07:48 )             40.6     05-20    142  |  110<H>  |  29<H>  ----------------------------<  159<H>  4.0   |  22  |  0.59    Ca    8.4      20 May 2021 07:48  Mg     1.9     05-20

## 2021-05-21 ENCOUNTER — TRANSCRIPTION ENCOUNTER (OUTPATIENT)
Age: 76
End: 2021-05-21

## 2021-05-21 VITALS
HEART RATE: 80 BPM | OXYGEN SATURATION: 97 % | RESPIRATION RATE: 18 BRPM | SYSTOLIC BLOOD PRESSURE: 156 MMHG | DIASTOLIC BLOOD PRESSURE: 67 MMHG

## 2021-05-21 PROBLEM — I10 ESSENTIAL (PRIMARY) HYPERTENSION: Chronic | Status: ACTIVE | Noted: 2021-05-18

## 2021-05-21 PROBLEM — E03.9 HYPOTHYROIDISM, UNSPECIFIED: Chronic | Status: ACTIVE | Noted: 2021-05-18

## 2021-05-21 PROBLEM — E11.9 TYPE 2 DIABETES MELLITUS WITHOUT COMPLICATIONS: Chronic | Status: ACTIVE | Noted: 2021-05-18

## 2021-05-21 PROBLEM — Z00.00 ENCOUNTER FOR PREVENTIVE HEALTH EXAMINATION: Status: ACTIVE | Noted: 2021-05-21

## 2021-05-21 LAB
ANION GAP SERPL CALC-SCNC: 9 MMOL/L — SIGNIFICANT CHANGE UP (ref 5–17)
BUN SERPL-MCNC: 28 MG/DL — HIGH (ref 7–23)
CALCIUM SERPL-MCNC: 8.3 MG/DL — LOW (ref 8.4–10.5)
CHLORIDE SERPL-SCNC: 107 MMOL/L — SIGNIFICANT CHANGE UP (ref 96–108)
CO2 SERPL-SCNC: 23 MMOL/L — SIGNIFICANT CHANGE UP (ref 22–31)
CREAT SERPL-MCNC: 0.6 MG/DL — SIGNIFICANT CHANGE UP (ref 0.5–1.3)
GLUCOSE BLDC GLUCOMTR-MCNC: 143 MG/DL — HIGH (ref 70–99)
GLUCOSE BLDC GLUCOMTR-MCNC: 165 MG/DL — HIGH (ref 70–99)
GLUCOSE SERPL-MCNC: 149 MG/DL — HIGH (ref 70–99)
HCT VFR BLD CALC: 39.8 % — SIGNIFICANT CHANGE UP (ref 34.5–45)
HGB BLD-MCNC: 12.8 G/DL — SIGNIFICANT CHANGE UP (ref 11.5–15.5)
MAGNESIUM SERPL-MCNC: 1.8 MG/DL — SIGNIFICANT CHANGE UP (ref 1.6–2.6)
MCHC RBC-ENTMCNC: 30.1 PG — SIGNIFICANT CHANGE UP (ref 27–34)
MCHC RBC-ENTMCNC: 32.2 GM/DL — SIGNIFICANT CHANGE UP (ref 32–36)
MCV RBC AUTO: 93.6 FL — SIGNIFICANT CHANGE UP (ref 80–100)
NRBC # BLD: 0 /100 WBCS — SIGNIFICANT CHANGE UP (ref 0–0)
PLATELET # BLD AUTO: 204 K/UL — SIGNIFICANT CHANGE UP (ref 150–400)
POTASSIUM SERPL-MCNC: 4.3 MMOL/L — SIGNIFICANT CHANGE UP (ref 3.5–5.3)
POTASSIUM SERPL-SCNC: 4.3 MMOL/L — SIGNIFICANT CHANGE UP (ref 3.5–5.3)
RBC # BLD: 4.25 M/UL — SIGNIFICANT CHANGE UP (ref 3.8–5.2)
RBC # FLD: 12.8 % — SIGNIFICANT CHANGE UP (ref 10.3–14.5)
SODIUM SERPL-SCNC: 139 MMOL/L — SIGNIFICANT CHANGE UP (ref 135–145)
WBC # BLD: 7.43 K/UL — SIGNIFICANT CHANGE UP (ref 3.8–10.5)
WBC # FLD AUTO: 7.43 K/UL — SIGNIFICANT CHANGE UP (ref 3.8–10.5)

## 2021-05-21 PROCEDURE — 84484 ASSAY OF TROPONIN QUANT: CPT

## 2021-05-21 PROCEDURE — A9505: CPT

## 2021-05-21 PROCEDURE — 70551 MRI BRAIN STEM W/O DYE: CPT

## 2021-05-21 PROCEDURE — 97116 GAIT TRAINING THERAPY: CPT

## 2021-05-21 PROCEDURE — 95714 VEEG EA 12-26 HR UNMNTR: CPT

## 2021-05-21 PROCEDURE — 85025 COMPLETE CBC W/AUTO DIFF WBC: CPT

## 2021-05-21 PROCEDURE — 93017 CV STRESS TEST TRACING ONLY: CPT

## 2021-05-21 PROCEDURE — 73610 X-RAY EXAM OF ANKLE: CPT

## 2021-05-21 PROCEDURE — 86769 SARS-COV-2 COVID-19 ANTIBODY: CPT

## 2021-05-21 PROCEDURE — 93970 EXTREMITY STUDY: CPT

## 2021-05-21 PROCEDURE — 83880 ASSAY OF NATRIURETIC PEPTIDE: CPT

## 2021-05-21 PROCEDURE — 85379 FIBRIN DEGRADATION QUANT: CPT

## 2021-05-21 PROCEDURE — 82962 GLUCOSE BLOOD TEST: CPT

## 2021-05-21 PROCEDURE — 82010 KETONE BODYS QUAN: CPT

## 2021-05-21 PROCEDURE — 82330 ASSAY OF CALCIUM: CPT

## 2021-05-21 PROCEDURE — C1769: CPT

## 2021-05-21 PROCEDURE — U0003: CPT

## 2021-05-21 PROCEDURE — 95700 EEG CONT REC W/VID EEG TECH: CPT

## 2021-05-21 PROCEDURE — 84145 PROCALCITONIN (PCT): CPT

## 2021-05-21 PROCEDURE — C1894: CPT

## 2021-05-21 PROCEDURE — 36415 COLL VENOUS BLD VENIPUNCTURE: CPT

## 2021-05-21 PROCEDURE — 97162 PT EVAL MOD COMPLEX 30 MIN: CPT

## 2021-05-21 PROCEDURE — 72125 CT NECK SPINE W/O DYE: CPT

## 2021-05-21 PROCEDURE — 83036 HEMOGLOBIN GLYCOSYLATED A1C: CPT

## 2021-05-21 PROCEDURE — 93005 ELECTROCARDIOGRAM TRACING: CPT

## 2021-05-21 PROCEDURE — 82553 CREATINE MB FRACTION: CPT

## 2021-05-21 PROCEDURE — 73590 X-RAY EXAM OF LOWER LEG: CPT

## 2021-05-21 PROCEDURE — 82803 BLOOD GASES ANY COMBINATION: CPT

## 2021-05-21 PROCEDURE — 99285 EMERGENCY DEPT VISIT HI MDM: CPT

## 2021-05-21 PROCEDURE — 70450 CT HEAD/BRAIN W/O DYE: CPT

## 2021-05-21 PROCEDURE — A9500: CPT

## 2021-05-21 PROCEDURE — 84295 ASSAY OF SERUM SODIUM: CPT

## 2021-05-21 PROCEDURE — 85027 COMPLETE CBC AUTOMATED: CPT

## 2021-05-21 PROCEDURE — 73030 X-RAY EXAM OF SHOULDER: CPT

## 2021-05-21 PROCEDURE — U0005: CPT

## 2021-05-21 PROCEDURE — 84100 ASSAY OF PHOSPHORUS: CPT

## 2021-05-21 PROCEDURE — 84436 ASSAY OF TOTAL THYROXINE: CPT

## 2021-05-21 PROCEDURE — 93306 TTE W/DOPPLER COMPLETE: CPT

## 2021-05-21 PROCEDURE — 80048 BASIC METABOLIC PNL TOTAL CA: CPT

## 2021-05-21 PROCEDURE — 82550 ASSAY OF CK (CPK): CPT

## 2021-05-21 PROCEDURE — 74177 CT ABD & PELVIS W/CONTRAST: CPT

## 2021-05-21 PROCEDURE — 85730 THROMBOPLASTIN TIME PARTIAL: CPT

## 2021-05-21 PROCEDURE — 87086 URINE CULTURE/COLONY COUNT: CPT

## 2021-05-21 PROCEDURE — 84443 ASSAY THYROID STIM HORMONE: CPT

## 2021-05-21 PROCEDURE — 84132 ASSAY OF SERUM POTASSIUM: CPT

## 2021-05-21 PROCEDURE — 80053 COMPREHEN METABOLIC PANEL: CPT

## 2021-05-21 PROCEDURE — 78452 HT MUSCLE IMAGE SPECT MULT: CPT

## 2021-05-21 PROCEDURE — C1764: CPT

## 2021-05-21 PROCEDURE — 81001 URINALYSIS AUTO W/SCOPE: CPT

## 2021-05-21 PROCEDURE — C1887: CPT

## 2021-05-21 PROCEDURE — 71260 CT THORAX DX C+: CPT

## 2021-05-21 PROCEDURE — 83735 ASSAY OF MAGNESIUM: CPT

## 2021-05-21 PROCEDURE — 84550 ASSAY OF BLOOD/URIC ACID: CPT

## 2021-05-21 PROCEDURE — 33285 INSJ SUBQ CAR RHYTHM MNTR: CPT

## 2021-05-21 PROCEDURE — 71045 X-RAY EXAM CHEST 1 VIEW: CPT

## 2021-05-21 PROCEDURE — 96361 HYDRATE IV INFUSION ADD-ON: CPT

## 2021-05-21 PROCEDURE — 93880 EXTRACRANIAL BILAT STUDY: CPT

## 2021-05-21 PROCEDURE — 83605 ASSAY OF LACTIC ACID: CPT

## 2021-05-21 PROCEDURE — 80061 LIPID PANEL: CPT

## 2021-05-21 RX ORDER — AMLODIPINE BESYLATE 2.5 MG/1
1 TABLET ORAL
Qty: 30 | Refills: 3
Start: 2021-05-21 | End: 2021-09-17

## 2021-05-21 RX ORDER — LEVOTHYROXINE SODIUM 125 MCG
1 TABLET ORAL
Qty: 0 | Refills: 0 | DISCHARGE

## 2021-05-21 RX ORDER — LEVOTHYROXINE SODIUM 125 MCG
1 TABLET ORAL
Qty: 0 | Refills: 0 | DISCHARGE
Start: 2021-05-21

## 2021-05-21 RX ORDER — MAGNESIUM OXIDE 400 MG ORAL TABLET 241.3 MG
400 TABLET ORAL ONCE
Refills: 0 | Status: COMPLETED | OUTPATIENT
Start: 2021-05-21 | End: 2021-05-21

## 2021-05-21 RX ORDER — METOPROLOL TARTRATE 50 MG
1 TABLET ORAL
Qty: 30 | Refills: 3
Start: 2021-05-21 | End: 2021-09-17

## 2021-05-21 RX ADMIN — LISINOPRIL 40 MILLIGRAM(S): 2.5 TABLET ORAL at 05:34

## 2021-05-21 RX ADMIN — Medication 125 MICROGRAM(S): at 05:34

## 2021-05-21 RX ADMIN — Medication 2: at 12:06

## 2021-05-21 RX ADMIN — AMLODIPINE BESYLATE 5 MILLIGRAM(S): 2.5 TABLET ORAL at 05:34

## 2021-05-21 RX ADMIN — NYSTATIN CREAM 1 APPLICATION(S): 100000 CREAM TOPICAL at 08:27

## 2021-05-21 RX ADMIN — MAGNESIUM OXIDE 400 MG ORAL TABLET 400 MILLIGRAM(S): 241.3 TABLET ORAL at 09:53

## 2021-05-21 RX ADMIN — Medication 25 MILLIGRAM(S): at 05:34

## 2021-05-21 NOTE — DIETITIAN INITIAL EVALUATION ADULT. - OTHER CALCULATIONS
IBW used to calculate energy needs due to pt's current body weight exceeding 120% of IBW, adjusted for age, Risk for malnutrition in setting of Poor PO PTA; fluids per team 2/2 Renal

## 2021-05-21 NOTE — DIETITIAN INITIAL EVALUATION ADULT. - OTHER INFO
75 y/o Female, former smoker (quit > 20 years ago), PMHx of NIDDM, HTN, HLD, hypothyroidism, who presents s/p being found at home, on the ground, in the bathroom. Pt reports she used the bathroom and ended up on the floor without recalling how or events leading prior. Now admitted to cardiology/telemetry for further management of syncope and rhabdomyolysis. Found to have elevated trop (peak 0.13) s/p IV hydration with Neuro/EP following. MRI Head/EEG negative, neuro signed off. EP plans to place ILR prior to d/c. NST (+) for inferior ischemia, cardiac cath 5/20 (f/u results).     Pt seen alert in room on 5 UR. Pt reporting unclear info. Ordered for [facility] soft diet, reports consuming yogurt and fruit today at breakfast, salmon the other night for dinner. Noted to have been found to be on floor for possibly 4 days, assume not eating during this time. Usually does not cook for her self. Reports getting most food from a market deli. Likes chicken wings which she feels are boiled, also likes chocolate chip pancakes with sugar free syrup. Seems to have limited insight into diet however Reports trying to diet PTA, further details not stated. Pt reports at one time weighing ~250 pounds, reports wt loss down to ~190 pound range. Admitted at 145 pounds, with wts of 165/168 pounds noted during admission which seem accurate. Will cont to monitor and trend wts. NKFA. No issues chewing/swallowing. No pain. Cleve 17. No edema. No pressure ulcers, skin tears/scabs noted.   Please see below for nutritions recommendations. Recs made with team.

## 2021-05-21 NOTE — DISCHARGE NOTE PROVIDER - NSDCMRMEDTOKEN_GEN_ALL_CORE_FT
glimepiride 2 mg oral tablet: 1 tab(s) orally once a day  Invokana 300 mg oral tablet: 1 tab(s) orally once a day  metFORMIN 1000 mg oral tablet: 1 tab(s) orally 2 times a day  quinapril 40 mg oral tablet: 1 tab(s) orally once a day  simvastatin 40 mg oral tablet: 1 tab(s) orally once a day (at bedtime)  Synthroid 125 mcg (0.125 mg) oral tablet: 1 tab(s) orally once a day   amLODIPine 5 mg oral tablet: 1 tab(s) orally once a day  glimepiride 2 mg oral tablet: 1 tab(s) orally once a day  Invokana 300 mg oral tablet: 1 tab(s) orally once a day  levothyroxine 125 mcg (0.125 mg) oral tablet: 1 tab(s) orally once a day  metFORMIN 1000 mg oral tablet: 1 tab(s) orally 2 times a day  metoprolol succinate 25 mg oral tablet, extended release: 1 tab(s) orally once a day  quinapril 40 mg oral tablet: 1 tab(s) orally once a day  simvastatin 40 mg oral tablet: 1 tab(s) orally once a day (at bedtime)

## 2021-05-21 NOTE — DIETITIAN INITIAL EVALUATION ADULT. - PROBLEM SELECTOR PLAN 4
Hx of diabetes; found with a glucose of >300 and anion gap, likely elevated due to dehydration as patient's glucose improved with IV fluids   - F/u a1c  - Home meds: Invokana 300, Glimeperide 2, Metformin 1000 mg BID   - Continue MISS  - F/u beta hydroxybutate, anion gap in am

## 2021-05-21 NOTE — DIETITIAN INITIAL EVALUATION ADULT. - PERTINENT LABORATORY DATA
BUN 28, Cr WDL  glucose 149  last 3 POCT 143 186 125  A1c 6.0%    HDL 41   AST SGOT 69   ALT SGPT 49

## 2021-05-21 NOTE — DISCHARGE NOTE PROVIDER - NSDCCPCAREPLAN_GEN_ALL_CORE_FT
PRINCIPAL DISCHARGE DIAGNOSIS  Diagnosis: Syncope, unspecified syncope type  Assessment and Plan of Treatment: You were admitted in the setting of either a fall or a syncopal episode.      SECONDARY DISCHARGE DIAGNOSES  Diagnosis: Non-traumatic rhabdomyolysis  Assessment and Plan of Treatment:      PRINCIPAL DISCHARGE DIAGNOSIS  Diagnosis: Syncope, unspecified syncope type  Assessment and Plan of Treatment: You were admitted in the setting of a syncopal (passing out) episode.  The CT scan of your head was negative for a fracture, bleeding or stroke.  There was no seizure activity during your brain monitoring. There were no abnormal heart rhythms on your heart monitor.  You had an echocardiogram (sonogram of your heart) that was normal. You did have an abnormal stress test and then underwent a cardiac cathterization/angiogram.  You DO NOT have any blockages in the arteries of your heart.  The procedure was done through the wrist. Please avoid any heavy lifting  (no more than 3 to 5 lbs) or strenuous activity for five days. If you develop any swelling, bleeding, hardening of the skin (hematoma formation), acute pain, numbness/tingling  in your arm please contact your doctor immediately or call our 24/7 line: [phone].  You did have an implantable heart monitor called a loop recorder placed. Please follow up with Electrophysiology team in ______ weeks to check on the loop recorder.   Please make a follow up appointment with your cardiologist Neo Durantin 1-2 weeks of your discharge.  The syncope was likely due to dehydration. Please make sure to eat and drink plenty of fluids especially when the temperature is hot. Please also avoid crowded places.      SECONDARY DISCHARGE DIAGNOSES  Diagnosis: Non-traumatic rhabdomyolysis  Assessment and Plan of Treatment: When you are imobile for long periods of time after a fall as a result you can have acute tissue breakdown. You were treated with intravenous fluids with resolution.  Please continue to keep yourself hydrated.    Diagnosis: HTN (hypertension)  Assessment and Plan of Treatment: You have a history of elevated blood pressure and you should continue your blood pressure medication Quinapril  40 mg daily. Please start metoprolol succiante (Toprol) 25 mg daily.       Diagnosis: Type 2 diabetes mellitus  Assessment and Plan of Treatment: If you are a diabetic and you take Metformin: DO NOT TAKE your Metformin for two days. This medication can interact with the contrast used during your procedure therefore we want to ensure the contrast has left your body prior to you restarting your Metformin.   Make sure you monitor your finger sticks and diet while you are not taking your Metformin!  PLEASE HOLD AND RESTART METFORMIN ON Sunday 5/23/21.       PRINCIPAL DISCHARGE DIAGNOSIS  Diagnosis: Syncope, unspecified syncope type  Assessment and Plan of Treatment: You were admitted in the setting of a syncopal (passing out) episode.  The CT scan of your head was negative for a fracture, bleeding or stroke.  There was no seizure activity during your brain monitoring. There were no abnormal heart rhythms on your heart monitor.  You had an echocardiogram (sonogram of your heart) that was normal. You did have an abnormal stress test and then underwent a cardiac cathterization/angiogram.  You DO NOT have any blockages in the arteries of your heart.  The procedure was done through the wrist. Please avoid any heavy lifting  (no more than 3 to 5 lbs) or strenuous activity for five days. If you develop any swelling, bleeding, hardening of the skin (hematoma formation), acute pain, numbness/tingling  in your arm please contact your doctor immediately or call our 24/7 line: [phone].  You did have an implantable heart monitor called a loop recorder placed. Please follow up with Electrophysiology team in ______ weeks to check on the loop recorder.   Please make a follow up appointment with your cardiologist Neo Durantin 1-2 weeks of your discharge.  The syncope was likely due to dehydration. Please make sure to eat and drink plenty of fluids especially when the temperature is hot. Please also avoid crowded places.      SECONDARY DISCHARGE DIAGNOSES  Diagnosis: Non-traumatic rhabdomyolysis  Assessment and Plan of Treatment: When you are imobile for long periods of time after a fall as a result you can have acute tissue breakdown. You were treated with intravenous fluids with resolution.  Please continue to keep yourself hydrated.    Diagnosis: HTN (hypertension)  Assessment and Plan of Treatment: You have a history of elevated blood pressure and you should continue your blood pressure medication Quinapril  40 mg daily. Please start metoprolol succiante (Toprol) 25 mg daily and Amlodipine (Noravsc) 5 mg daily.       Diagnosis: Type 2 diabetes mellitus  Assessment and Plan of Treatment: If you are a diabetic and you take Metformin: DO NOT TAKE your Metformin for two days. This medication can interact with the contrast used during your procedure therefore we want to ensure the contrast has left your body prior to you restarting your Metformin.   Make sure you monitor your finger sticks and diet while you are not taking your Metformin!  PLEASE HOLD AND RESTART METFORMIN ON Sunday 5/23/21.       PRINCIPAL DISCHARGE DIAGNOSIS  Diagnosis: Syncope, unspecified syncope type  Assessment and Plan of Treatment: You were admitted in the setting of a syncopal (passing out) episode.  The CT scan of your head was negative for a fracture, bleeding or stroke.  There was no seizure activity during your brain monitoring. There were no abnormal heart rhythms on your heart monitor.  You had an echocardiogram (sonogram of your heart) that was normal. You did have an abnormal stress test and then underwent a cardiac cathterization/angiogram.  You DO NOT have any blockages in the arteries of your heart.  The procedure was done through the wrist. Please avoid any heavy lifting  (no more than 3 to 5 lbs) or strenuous activity for five days. If you develop any swelling, bleeding, hardening of the skin (hematoma formation), acute pain, numbness/tingling  in your arm please contact your doctor immediately or call our 24/7 line: [phone].  You did have an implantable heart monitor called a loop recorder placed. Please follow up with Electrophysiology team DR Bunrham on 6/16/21 @ 1:15 pm to check on the loop recorder.   Please make a follow up appointment with your cardiologist Neo Durantin 1-2 weeks of your discharge.  The syncope was likely due to dehydration. Please make sure to eat and drink plenty of fluids especially when the temperature is hot. Please also avoid crowded places.      SECONDARY DISCHARGE DIAGNOSES  Diagnosis: Non-traumatic rhabdomyolysis  Assessment and Plan of Treatment: When you are imobile for long periods of time after a fall as a result you can have acute tissue breakdown. You were treated with intravenous fluids with resolution.  Please continue to keep yourself hydrated.    Diagnosis: HTN (hypertension)  Assessment and Plan of Treatment: You have a history of elevated blood pressure and you should continue your blood pressure medication Quinapril  40 mg daily. Please start metoprolol succiante (Toprol) 25 mg daily and Amlodipine (Noravsc) 5 mg daily.       Diagnosis: Type 2 diabetes mellitus  Assessment and Plan of Treatment: If you are a diabetic and you take Metformin: DO NOT TAKE your Metformin for two days. This medication can interact with the contrast used during your procedure therefore we want to ensure the contrast has left your body prior to you restarting your Metformin.   Make sure you monitor your finger sticks and diet while you are not taking your Metformin!  PLEASE HOLD AND RESTART METFORMIN ON Sunday 5/23/21.

## 2021-05-21 NOTE — DIETITIAN INITIAL EVALUATION ADULT. - ADD RECOMMEND
Monitor PO intake/appetite, GI distress, diet tolerance, s/s of issues chewing/swallowing, labs, weights.  RD to remain available for additional nutrition interventions as needed.

## 2021-05-21 NOTE — DISCHARGE NOTE PROVIDER - HOSPITAL COURSE
77 y/o Female, former smoker (quit > 20 years ago), w/ PMHx of NIDDM, HTN, HLD, hypothyroidism, who presents s/p being found at home, on the ground, in the bathroom. Pt reports she used the bathroom and ended up on the floor without recalling how or events leading prior. Pt was last spoken to on Friday (4 days ago). Pt was unable to be reached yesterday, therefore a building staff member went into the apartment today, finding her on the floor. Pt denies CP, SOB, f/c, URI, GI,  sx. Pt was incontinent of urine. Pt denies specific complaints. Pt lives alone, normally ambulates on her own, performs all ADLs, and only has help at home in terms of housekeeping once weekly. Pt is accompanied in the ED by her nephew, whom lives in [place], who met the pt in the ED. He did not see the pt in her home. No AC use. Denies hx cardiac disease. Pt had COVID19 vaccine. In the ED, VS- T:98.3 F BP: 136/98 mmHg  HR: 98 beats/min RR: 17 breaths/min spO2: 95% on RA.   Labs significant for: WBC: 14.59 w/ left shift, Hgb/Hct: 16.1/49.7, CO: 18, BUN/Cr: 61/1.23, glucose: 203, AST/ALT: 96/55, Beta-hydroxy butyrate: 5.0, CK: 4671 ->3787. CKMB: 232 ->181.5, troponin: 0.13->0.10. UA with moderate bilirubin, ketones: 40, protein: 30, + leukocyte esterase. CT Head non-contrast: negative for intracranial abnormality. CT Abdomen/Pelvis: No acute pathology identified. Gallbladder compacted with stones.1.3 cm left renal hypodense lesion measuring 23 Hounsfield units. Recommend follow-up with ultrasound to differentiate cyst versus solid lesion. No acute pathology identified. CT Chest with calcified aorta.  Patient received: IV NS 1000 cc bolus x 2 in ED.  Patient admitted to cardiology/telemetry for further management of syncope and rhabdomyolysis. Trop peaked at 0.13 s/p IV hydration with Neuro/EP following. MRI Head/EEG negative, neuro signed off. EP consulted to place ILR prior to d/c. NST (+) for inferior ischemia. Diagnostic cardiac cath 5/20/21: nonobstructive CAD. EF 75% from Echo. Right radial TR 530pm. No EDP done.  ECHO 5/18: hyperdynamic EF 75% with asymmetric LVH and no valvular disorders. Carotid US negative.  B/L LE dopplers negative. Syncope likely in setting of dehydration and patient educated about staying hydrated. Resolution of rhabdo with maintenance IVF.   PT rec: home with home PT with rolling walker. Pt is stable for discharge per Dr Rubin and will follow up with oupt cardiologist Dr Neo Laguerre in 1-2 weeks.        77 y/o Female, former smoker (quit > 20 years ago), w/ PMHx of NIDDM, HTN, HLD, hypothyroidism, who presents s/p being found at home, on the ground, in the bathroom. Pt reports she used the bathroom and ended up on the floor without recalling how or events leading prior. Pt was last spoken to on Friday (4 days ago). Pt was unable to be reached yesterday, therefore a building staff member went into the apartment today, finding her on the floor. Pt denies CP, SOB, f/c, URI, GI,  sx. Pt was incontinent of urine. Pt denies specific complaints. Pt lives alone, normally ambulates on her own, performs all ADLs, and only has help at home in terms of housekeeping once weekly. Pt is accompanied in the ED by her nephew, whom lives in [place], who met the pt in the ED. He did not see the pt in her home. No AC use. Denies hx cardiac disease. Pt had COVID19 vaccine. In the ED, VS- T:98.3 F BP: 136/98 mmHg  HR: 98 beats/min RR: 17 breaths/min spO2: 95% on RA.   Labs significant for: WBC: 14.59 w/ left shift, Hgb/Hct: 16.1/49.7, CO: 18, BUN/Cr: 61/1.23, glucose: 203, AST/ALT: 96/55, Beta-hydroxy butyrate: 5.0, CK: 4671 ->3787. CKMB: 232 ->181.5, troponin: 0.13->0.10. UA with moderate bilirubin, ketones: 40, protein: 30, + leukocyte esterase. CT Head non-contrast: negative for intracranial abnormality. CT Abdomen/Pelvis: No acute pathology identified. Gallbladder compacted with stones.1.3 cm left renal hypodense lesion measuring 23 Hounsfield units. Recommend follow-up with ultrasound to differentiate cyst versus solid lesion. No acute pathology identified. CT Chest with calcified aorta.  Patient received: IV NS 1000 cc bolus x 2 in ED.  Patient admitted to cardiology/telemetry for further management of syncope and rhabdomyolysis. Trop peaked at 0.13 s/p IV hydration with Neuro/EP following. MRI Head/EEG negative, neuro signed off. EP consulted to place ILR prior to d/c. NST (+) for inferior ischemia. Diagnostic cardiac cath 5/20/21: nonobstructive CAD. EF 75% from Echo. Right radial TR 530pm. No EDP done.  ECHO 5/18/21 hyperdynamic EF 75% with asymmetric LVH and no valvular disorders. Carotid US negative.  B/L LE dopplers negative. Syncope likely in setting of dehydration and patient educated about staying hydrated. Resolution of rhabdo with maintenance IVF.  PT recommended home with home PT with rolling walker. Pt is stable for discharge per Dr Rubin and will follow up with oupt cardiologist Dr Neo Laguerre in 1-2 weeks.        75 y/o Female, former smoker (quit > 20 years ago), w/ PMHx of NIDDM, HTN, HLD, hypothyroidism, who presents s/p being found at home, on the ground, in the bathroom. Pt reports she used the bathroom and ended up on the floor without recalling how or events leading prior. Pt was last spoken to on Friday (4 days ago). Pt was unable to be reached yesterday, therefore a building staff member went into the apartment today, finding her on the floor. Pt denies CP, SOB, f/c, URI, GI,  sx. Pt was incontinent of urine. Pt denies specific complaints. Pt lives alone, normally ambulates on her own, performs all ADLs, and only has help at home in terms of housekeeping once weekly. Pt is accompanied in the ED by her nephew, whom lives in [place], who met the pt in the ED. He did not see the pt in her home. No AC use. Denies hx cardiac disease. Pt had COVID19 vaccine. In the ED, VS- T:98.3 F BP: 136/98 mmHg  HR: 98 beats/min RR: 17 breaths/min spO2: 95% on RA.   Labs significant for: WBC: 14.59 w/ left shift, Hgb/Hct: 16.1/49.7, CO: 18, BUN/Cr: 61/1.23, glucose: 203, AST/ALT: 96/55, Beta-hydroxy butyrate: 5.0, CK: 4671 ->3787. CKMB: 232 ->181.5, troponin: 0.13->0.10. UA with moderate bilirubin, ketones: 40, protein: 30, + leukocyte esterase. CT Head non-contrast: negative for intracranial abnormality. CT Abdomen/Pelvis: No acute pathology identified. Gallbladder compacted with stones.1.3 cm left renal hypodense lesion measuring 23 Hounsfield units. Recommend follow-up with ultrasound to differentiate cyst versus solid lesion. No acute pathology identified. CT Chest with calcified aorta.  Patient received: IV NS 1000 cc bolus x 2 in ED.  Patient admitted to cardiology/telemetry for further management of syncope and rhabdomyolysis. Trop peaked at 0.13 s/p IV hydration with Neuro/EP following. MRI Head/EEG negative, neuro signed off. EP consulted to place ILR prior to d/c. NST (+) for inferior ischemia. Diagnostic cardiac cath 5/20/21: nonobstructive CAD. EF 75% from Echo. Right radial TR 530pm. No EDP done.  ECHO 5/18/21 hyperdynamic EF 75% with asymmetric LVH and no valvular disorders. Carotid US negative.  B/L LE dopplers negative. Syncope likely in setting of dehydration and patient educated about staying hydrated. Resolution of rhabdo with maintenance IVF.  PT recommended home with home PT with rolling walker. Pt is stable for discharge per Dr Rubni and will follow up with oupt cardiologist Dr Neo Laguerre in 1-2 weeks and Dr Burnham on 6/16/21 @ 1:15 pm

## 2021-05-21 NOTE — DISCHARGE NOTE NURSING/CASE MANAGEMENT/SOCIAL WORK - PATIENT PORTAL LINK FT
You can access the FollowMyHealth Patient Portal offered by [facility] by registering at the following website: [email or web address]. By joining [employer]’s FollowMyHealth portal, you will also be able to view your health information using other applications (apps) compatible with our system.

## 2021-05-21 NOTE — DISCHARGE NOTE PROVIDER - PROVIDER TOKENS
PROVIDER:[TOKEN:[[id number]],ESTABLISHEDPATIENT:[T]] PROVIDER:[TOKEN:[[id number]],ESTABLISHEDPATIENT:[T]],PROVIDER:[TOKEN:[[id number]],FOLLOWUP:[2 weeks]] PROVIDER:[TOKEN:[[id number]],ESTABLISHEDPATIENT:[T]],PROVIDER:[TOKEN:[[id number]],FOLLOWUP:[2 weeks]],PROVIDER:[TOKEN:[[id number]],SCHEDULEDAPPT:[06/16/2021],SCHEDULEDAPPTTIME:[01:15 PM]]

## 2021-05-21 NOTE — DIETITIAN INITIAL EVALUATION ADULT. - PROBLEM SELECTOR PLAN 2
Patient found to be on floor for possibly 4 days s/p fall vs. syncope   - CPK: 4671-> 3787, BUN/CR: 61/1.23, AST/ALT: 96/55, WBC elevated to 14.59, Hgb/Hct: 16.1/49.7  - UA with moderate bilirubin, ketones, and protein   - Patient given NS 1000 ml bolus x 2 in ED  - Trend CPK, CMP, CBC, BHB, anion gap   - continue fluids - 1/2 NS at 100 cc/hr x 6 hours

## 2021-05-21 NOTE — DIETITIAN INITIAL EVALUATION ADULT. - PROBLEM SELECTOR PLAN 1
- Abrupt LOC while in bathroom - patient unclear what happened prior to or when she fell, but was found on the ground after about 3 days   - Head CT negative for intracranial pathology. Multiple limb xrays negative for acute fractures.   - Patient with ecchymosis on multiple areas of body.    - Troponin: 0.13 -0.010  - EKG: Sinus tachycardia at 108 bpm, poor R wave progression, PVCs   - CT abdomen/pelvis: negative for acute pathology. Lactate negative.   - UA + for ketones, leukocyte esterase, glucose, f/u urine culture   - Patient found to be severely dehydrated, unclear if dehydration occurred before or after fall   - F/u tsh, procalcitonin, blood cultures, lactate in am, bhb  - tele monitoring and echo ordered   - consider EP or neuro

## 2021-05-21 NOTE — DISCHARGE NOTE PROVIDER - CARE PROVIDER_API CALL
NAIMA NATION  Cardiovascular Diseases  [address] [place] [address]  [place] [address]  Phone: [phone]  Fax: [phone]  Established Patient  Follow Up Time:    [facility] NATION  Cardiovascular Diseases  [address] [place] [address]  Phone: [phone]  Fax: [phone]  Established Patient  Follow Up Time:     Paula Iqbal)  EEGEpilepsy; Neurology  [address], [address] [place] [address]  Phone: [phone]  Fax: [phone]  Follow Up Time: 2 weeks   [facility]ATION  Cardiovascular Diseases  [address] [place] [address]  Phone: [phone]  Fax: [phone]  Established Patient  Follow Up Time:     Paula Iqbal)  EEGEpilepsy; Neurology  [address], [address] [place] [address]  Phone: [phone]  Fax: [phone]  Follow Up Time: 2 weeks    Rinku Burnham)  Cardiac Electrophysiology; Cardiovascular Disease  [address] [place] [address]  Phone: [phone]  Fax: [phone]  Scheduled Appointment: 06/16/2021 01:15 PM

## 2021-06-03 DIAGNOSIS — N17.9 ACUTE KIDNEY FAILURE, UNSPECIFIED: ICD-10-CM

## 2021-06-03 DIAGNOSIS — E03.9 HYPOTHYROIDISM, UNSPECIFIED: ICD-10-CM

## 2021-06-03 DIAGNOSIS — Z87.891 PERSONAL HISTORY OF NICOTINE DEPENDENCE: ICD-10-CM

## 2021-06-03 DIAGNOSIS — E78.5 HYPERLIPIDEMIA, UNSPECIFIED: ICD-10-CM

## 2021-06-03 DIAGNOSIS — N28.9 DISORDER OF KIDNEY AND URETER, UNSPECIFIED: ICD-10-CM

## 2021-06-03 DIAGNOSIS — E11.65 TYPE 2 DIABETES MELLITUS WITH HYPERGLYCEMIA: ICD-10-CM

## 2021-06-03 DIAGNOSIS — I70.0 ATHEROSCLEROSIS OF AORTA: ICD-10-CM

## 2021-06-03 DIAGNOSIS — R00.0 TACHYCARDIA, UNSPECIFIED: ICD-10-CM

## 2021-06-03 DIAGNOSIS — Z79.84 LONG TERM (CURRENT) USE OF ORAL HYPOGLYCEMIC DRUGS: ICD-10-CM

## 2021-06-03 DIAGNOSIS — R55 SYNCOPE AND COLLAPSE: ICD-10-CM

## 2021-06-03 DIAGNOSIS — F03.90 UNSPECIFIED DEMENTIA WITHOUT BEHAVIORAL DISTURBANCE: ICD-10-CM

## 2021-06-03 DIAGNOSIS — Z91.14 PATIENT'S OTHER NONCOMPLIANCE WITH MEDICATION REGIMEN: ICD-10-CM

## 2021-06-03 DIAGNOSIS — E86.0 DEHYDRATION: ICD-10-CM

## 2021-06-03 DIAGNOSIS — R33.9 RETENTION OF URINE, UNSPECIFIED: ICD-10-CM

## 2021-06-03 DIAGNOSIS — K80.20 CALCULUS OF GALLBLADDER WITHOUT CHOLECYSTITIS WITHOUT OBSTRUCTION: ICD-10-CM

## 2021-06-03 DIAGNOSIS — E88.89 OTHER SPECIFIED METABOLIC DISORDERS: ICD-10-CM

## 2021-06-03 DIAGNOSIS — I25.10 ATHEROSCLEROTIC HEART DISEASE OF NATIVE CORONARY ARTERY WITHOUT ANGINA PECTORIS: ICD-10-CM

## 2021-06-03 DIAGNOSIS — M62.82 RHABDOMYOLYSIS: ICD-10-CM

## 2021-06-03 DIAGNOSIS — R53.1 WEAKNESS: ICD-10-CM

## 2021-06-03 DIAGNOSIS — I10 ESSENTIAL (PRIMARY) HYPERTENSION: ICD-10-CM

## 2021-06-03 DIAGNOSIS — I49.3 VENTRICULAR PREMATURE DEPOLARIZATION: ICD-10-CM

## 2021-06-03 DIAGNOSIS — I25.84 CORONARY ATHEROSCLEROSIS DUE TO CALCIFIED CORONARY LESION: ICD-10-CM

## 2021-06-16 ENCOUNTER — NON-APPOINTMENT (OUTPATIENT)
Age: 76
End: 2021-06-16

## 2021-06-16 ENCOUNTER — APPOINTMENT (OUTPATIENT)
Dept: HEART AND VASCULAR | Facility: CLINIC | Age: 76
End: 2021-06-16
Payer: MEDICARE

## 2021-06-16 VITALS
WEIGHT: 176 LBS | SYSTOLIC BLOOD PRESSURE: 144 MMHG | OXYGEN SATURATION: 97 % | HEART RATE: 83 BPM | HEIGHT: 60 IN | DIASTOLIC BLOOD PRESSURE: 63 MMHG | BODY MASS INDEX: 34.55 KG/M2 | TEMPERATURE: 96.4 F

## 2021-06-16 DIAGNOSIS — Z87.898 PERSONAL HISTORY OF OTHER SPECIFIED CONDITIONS: ICD-10-CM

## 2021-06-16 DIAGNOSIS — Z87.891 PERSONAL HISTORY OF NICOTINE DEPENDENCE: ICD-10-CM

## 2021-06-16 PROCEDURE — 99212 OFFICE O/P EST SF 10 MIN: CPT | Mod: 25

## 2021-06-16 PROCEDURE — 93291 INTERROG DEV EVAL SCRMS IP: CPT

## 2021-06-21 PROBLEM — Z87.898 HISTORY OF SYNCOPE: Status: ACTIVE | Noted: 2021-06-21

## 2021-06-21 PROBLEM — Z87.891 FORMER SMOKER: Status: ACTIVE | Noted: 2021-06-21

## 2021-06-21 NOTE — DISCUSSION/SUMMARY
[FreeTextEntry1] : 75 y/o Female with NIDDM, HTN, HLD, hypothyroidism and syncope s/p ILR, who presents for follow up.  Device incision is well healed.  INterrogation reveals no events.  We discussed lifestyle modifications including staying well hydrated, eating small regular meals as opposed to large meals, avoiding rapid change in posture, avoiding standing in one place for a prolonged time and if she becomes lightheaded to go to a safe position.   She will follow up in 1 year or sooner if needed.  She will utilize remote monitoring in the interim and knows to call with any questions or concerns.

## 2021-06-21 NOTE — REVIEW OF SYSTEMS
[Fever] : no fever [Chills] : no chills [Feeling Fatigued] : not feeling fatigued [SOB] : no shortness of breath [Syncope] : no syncope [Cough] : no cough [Abdominal Pain] : no abdominal pain [Rash] : no rash [Dizziness] : no dizziness [Under Stress] : not under stress [Easy Bleeding] : no tendency for easy bleeding

## 2021-06-21 NOTE — PHYSICAL EXAM
[General Appearance - Well Developed] : well developed [Normal Appearance] : normal appearance [Well Groomed] : well groomed [General Appearance - Well Nourished] : well nourished [No Deformities] : no deformities [General Appearance - In No Acute Distress] : no acute distress [Heart Rate And Rhythm] : heart rate and rhythm were normal [Heart Sounds] : normal S1 and S2 [] : no respiratory distress [Respiration, Rhythm And Depth] : normal respiratory rhythm and effort [Exaggerated Use Of Accessory Muscles For Inspiration] : no accessory muscle use [Clean] : clean [Dry] : dry [Well-Healed] : well-healed [Palpable Crepitus] : no palpable crepitus [Bleeding] : no active bleeding [Foul Odor] : no foul smell [Purulent Drainage] : no purulent drainage [Serosanguineous Drainage] : no serosanquineous drainage [Serous Drainage] : no serous drainage [Erythema] : not erythematous [Warm] : not warm [Tender] : not tender [Indurated] : not indurated [Fluctuant] : not fluctuant [Cyanosis, Localized] : no localized cyanosis

## 2021-06-21 NOTE — HISTORY OF PRESENT ILLNESS
[de-identified] : 77 y/o Female with NIDDM, HTN, HLD, hypothyroidism and syncope s/p ILR, who presents for follow up.\par \par She was admitted 5/2021 after waking up on the floor and unclear how she got there. She was treated for rhabdomyolysis. Diagnostic cardiac cath 5/20/21: nonobstructive CAD.  ECHO 5/18/21 hyperdynamic EF 75% with asymmetric LVH and no valvular disorders. Carotid US negative.  B/L LE dopplers negative. She underwent an ILR and presents for follow up.  No further syncope.  No chest pain, SOB, orthopnea or palpitations.  No device related issues.

## 2021-06-28 ENCOUNTER — APPOINTMENT (OUTPATIENT)
Dept: HEART AND VASCULAR | Facility: CLINIC | Age: 76
End: 2021-06-28
Payer: MEDICARE

## 2021-06-28 ENCOUNTER — NON-APPOINTMENT (OUTPATIENT)
Age: 76
End: 2021-06-28

## 2021-06-28 PROCEDURE — G2066: CPT

## 2021-06-28 PROCEDURE — 93298 REM INTERROG DEV EVAL SCRMS: CPT

## 2021-08-05 ENCOUNTER — NON-APPOINTMENT (OUTPATIENT)
Age: 76
End: 2021-08-05

## 2021-08-05 ENCOUNTER — APPOINTMENT (OUTPATIENT)
Dept: HEART AND VASCULAR | Facility: CLINIC | Age: 76
End: 2021-08-05
Payer: MEDICARE

## 2021-08-05 PROCEDURE — 93298 REM INTERROG DEV EVAL SCRMS: CPT

## 2021-08-05 PROCEDURE — G2066: CPT

## 2021-09-08 ENCOUNTER — APPOINTMENT (OUTPATIENT)
Dept: HEART AND VASCULAR | Facility: CLINIC | Age: 76
End: 2021-09-08
Payer: MEDICARE

## 2021-09-08 ENCOUNTER — NON-APPOINTMENT (OUTPATIENT)
Age: 76
End: 2021-09-08

## 2021-09-08 PROCEDURE — G2066: CPT

## 2021-09-08 PROCEDURE — 93298 REM INTERROG DEV EVAL SCRMS: CPT

## 2021-10-13 ENCOUNTER — NON-APPOINTMENT (OUTPATIENT)
Age: 76
End: 2021-10-13

## 2021-10-13 ENCOUNTER — APPOINTMENT (OUTPATIENT)
Dept: HEART AND VASCULAR | Facility: CLINIC | Age: 76
End: 2021-10-13
Payer: MEDICARE

## 2021-10-13 PROCEDURE — 93298 REM INTERROG DEV EVAL SCRMS: CPT

## 2021-10-13 PROCEDURE — G2066: CPT

## 2021-11-17 ENCOUNTER — APPOINTMENT (OUTPATIENT)
Dept: HEART AND VASCULAR | Facility: CLINIC | Age: 76
End: 2021-11-17
Payer: MEDICARE

## 2021-11-17 ENCOUNTER — NON-APPOINTMENT (OUTPATIENT)
Age: 76
End: 2021-11-17

## 2021-11-17 PROCEDURE — G2066: CPT

## 2021-11-17 PROCEDURE — 93298 REM INTERROG DEV EVAL SCRMS: CPT

## 2021-12-22 ENCOUNTER — APPOINTMENT (OUTPATIENT)
Dept: HEART AND VASCULAR | Facility: CLINIC | Age: 76
End: 2021-12-22
Payer: MEDICARE

## 2021-12-22 ENCOUNTER — NON-APPOINTMENT (OUTPATIENT)
Age: 76
End: 2021-12-22

## 2021-12-22 PROCEDURE — 93298 REM INTERROG DEV EVAL SCRMS: CPT

## 2021-12-22 PROCEDURE — G2066: CPT

## 2022-01-26 ENCOUNTER — NON-APPOINTMENT (OUTPATIENT)
Age: 77
End: 2022-01-26

## 2022-01-26 ENCOUNTER — APPOINTMENT (OUTPATIENT)
Dept: HEART AND VASCULAR | Facility: CLINIC | Age: 77
End: 2022-01-26
Payer: MEDICARE

## 2022-01-26 PROCEDURE — G2066: CPT

## 2022-01-26 PROCEDURE — 93298 REM INTERROG DEV EVAL SCRMS: CPT

## 2022-03-02 ENCOUNTER — NON-APPOINTMENT (OUTPATIENT)
Age: 77
End: 2022-03-02

## 2022-03-02 ENCOUNTER — APPOINTMENT (OUTPATIENT)
Dept: HEART AND VASCULAR | Facility: CLINIC | Age: 77
End: 2022-03-02
Payer: MEDICARE

## 2022-03-02 PROCEDURE — 93298 REM INTERROG DEV EVAL SCRMS: CPT

## 2022-03-02 PROCEDURE — G2066: CPT

## 2022-04-07 ENCOUNTER — NON-APPOINTMENT (OUTPATIENT)
Age: 77
End: 2022-04-07

## 2022-04-07 ENCOUNTER — APPOINTMENT (OUTPATIENT)
Dept: HEART AND VASCULAR | Facility: CLINIC | Age: 77
End: 2022-04-07
Payer: MEDICARE

## 2022-04-07 PROCEDURE — 93298 REM INTERROG DEV EVAL SCRMS: CPT

## 2022-04-07 PROCEDURE — G2066: CPT

## 2022-05-11 ENCOUNTER — NON-APPOINTMENT (OUTPATIENT)
Age: 77
End: 2022-05-11

## 2022-05-11 ENCOUNTER — APPOINTMENT (OUTPATIENT)
Dept: HEART AND VASCULAR | Facility: CLINIC | Age: 77
End: 2022-05-11
Payer: MEDICARE

## 2022-05-11 PROCEDURE — 93298 REM INTERROG DEV EVAL SCRMS: CPT

## 2022-05-11 PROCEDURE — G2066: CPT

## 2022-06-15 ENCOUNTER — APPOINTMENT (OUTPATIENT)
Dept: HEART AND VASCULAR | Facility: CLINIC | Age: 77
End: 2022-06-15

## 2022-07-20 ENCOUNTER — NON-APPOINTMENT (OUTPATIENT)
Age: 77
End: 2022-07-20

## 2022-07-20 ENCOUNTER — APPOINTMENT (OUTPATIENT)
Dept: HEART AND VASCULAR | Facility: CLINIC | Age: 77
End: 2022-07-20

## 2022-07-20 PROCEDURE — G2066: CPT

## 2022-07-20 PROCEDURE — 93298 REM INTERROG DEV EVAL SCRMS: CPT

## 2022-08-24 ENCOUNTER — APPOINTMENT (OUTPATIENT)
Dept: HEART AND VASCULAR | Facility: CLINIC | Age: 77
End: 2022-08-24

## 2022-08-24 ENCOUNTER — NON-APPOINTMENT (OUTPATIENT)
Age: 77
End: 2022-08-24

## 2022-08-24 PROCEDURE — G2066: CPT

## 2022-08-24 PROCEDURE — 93298 REM INTERROG DEV EVAL SCRMS: CPT

## 2022-09-07 ENCOUNTER — EMERGENCY (EMERGENCY)
Facility: HOSPITAL | Age: 77
LOS: 1 days | Discharge: HOME | End: 2022-09-07
Attending: STUDENT IN AN ORGANIZED HEALTH CARE EDUCATION/TRAINING PROGRAM | Admitting: STUDENT IN AN ORGANIZED HEALTH CARE EDUCATION/TRAINING PROGRAM
Payer: MEDICARE

## 2022-09-07 VITALS
RESPIRATION RATE: 18 BRPM | SYSTOLIC BLOOD PRESSURE: 207 MMHG | TEMPERATURE: 98 F | HEIGHT: 61 IN | HEART RATE: 114 BPM | DIASTOLIC BLOOD PRESSURE: 89 MMHG | OXYGEN SATURATION: 95 %

## 2022-09-07 VITALS
TEMPERATURE: 98 F | SYSTOLIC BLOOD PRESSURE: 172 MMHG | RESPIRATION RATE: 18 BRPM | HEART RATE: 89 BPM | OXYGEN SATURATION: 96 % | DIASTOLIC BLOOD PRESSURE: 89 MMHG

## 2022-09-07 DIAGNOSIS — R42 DIZZINESS AND GIDDINESS: ICD-10-CM

## 2022-09-07 DIAGNOSIS — R29.898 OTHER SYMPTOMS AND SIGNS INVOLVING THE MUSCULOSKELETAL SYSTEM: ICD-10-CM

## 2022-09-07 DIAGNOSIS — R53.83 OTHER FATIGUE: ICD-10-CM

## 2022-09-07 DIAGNOSIS — E11.9 TYPE 2 DIABETES MELLITUS WITHOUT COMPLICATIONS: ICD-10-CM

## 2022-09-07 DIAGNOSIS — I10 ESSENTIAL (PRIMARY) HYPERTENSION: ICD-10-CM

## 2022-09-07 DIAGNOSIS — R00.0 TACHYCARDIA, UNSPECIFIED: ICD-10-CM

## 2022-09-07 DIAGNOSIS — Z79.84 LONG TERM (CURRENT) USE OF ORAL HYPOGLYCEMIC DRUGS: ICD-10-CM

## 2022-09-07 DIAGNOSIS — E03.9 HYPOTHYROIDISM, UNSPECIFIED: ICD-10-CM

## 2022-09-07 DIAGNOSIS — Z20.822 CONTACT WITH AND (SUSPECTED) EXPOSURE TO COVID-19: ICD-10-CM

## 2022-09-07 LAB
ALBUMIN SERPL ELPH-MCNC: 3.9 G/DL — SIGNIFICANT CHANGE UP (ref 3.3–5)
ALP SERPL-CCNC: 59 U/L — SIGNIFICANT CHANGE UP (ref 40–120)
ALT FLD-CCNC: 8 U/L — LOW (ref 10–45)
ANION GAP SERPL CALC-SCNC: 12 MMOL/L — SIGNIFICANT CHANGE UP (ref 5–17)
AST SERPL-CCNC: 17 U/L — SIGNIFICANT CHANGE UP (ref 10–40)
BASOPHILS # BLD AUTO: 0.03 K/UL — SIGNIFICANT CHANGE UP (ref 0–0.2)
BASOPHILS NFR BLD AUTO: 0.3 % — SIGNIFICANT CHANGE UP (ref 0–2)
BILIRUB SERPL-MCNC: 0.2 MG/DL — SIGNIFICANT CHANGE UP (ref 0.2–1.2)
BUN SERPL-MCNC: 15 MG/DL — SIGNIFICANT CHANGE UP (ref 7–23)
CALCIUM SERPL-MCNC: 9.7 MG/DL — SIGNIFICANT CHANGE UP (ref 8.4–10.5)
CHLORIDE SERPL-SCNC: 104 MMOL/L — SIGNIFICANT CHANGE UP (ref 96–108)
CO2 SERPL-SCNC: 26 MMOL/L — SIGNIFICANT CHANGE UP (ref 22–31)
CREAT SERPL-MCNC: 0.68 MG/DL — SIGNIFICANT CHANGE UP (ref 0.5–1.3)
EGFR: 89 ML/MIN/1.73M2 — SIGNIFICANT CHANGE UP
EOSINOPHIL # BLD AUTO: 0.09 K/UL — SIGNIFICANT CHANGE UP (ref 0–0.5)
EOSINOPHIL NFR BLD AUTO: 0.9 % — SIGNIFICANT CHANGE UP (ref 0–6)
GLUCOSE SERPL-MCNC: 116 MG/DL — HIGH (ref 70–99)
HCT VFR BLD CALC: 44.3 % — SIGNIFICANT CHANGE UP (ref 34.5–45)
HGB BLD-MCNC: 14.2 G/DL — SIGNIFICANT CHANGE UP (ref 11.5–15.5)
IMM GRANULOCYTES NFR BLD AUTO: 0.5 % — SIGNIFICANT CHANGE UP (ref 0–1.5)
LYMPHOCYTES # BLD AUTO: 1.57 K/UL — SIGNIFICANT CHANGE UP (ref 1–3.3)
LYMPHOCYTES # BLD AUTO: 15.5 % — SIGNIFICANT CHANGE UP (ref 13–44)
MCHC RBC-ENTMCNC: 29.8 PG — SIGNIFICANT CHANGE UP (ref 27–34)
MCHC RBC-ENTMCNC: 32.1 GM/DL — SIGNIFICANT CHANGE UP (ref 32–36)
MCV RBC AUTO: 93.1 FL — SIGNIFICANT CHANGE UP (ref 80–100)
MONOCYTES # BLD AUTO: 0.65 K/UL — SIGNIFICANT CHANGE UP (ref 0–0.9)
MONOCYTES NFR BLD AUTO: 6.4 % — SIGNIFICANT CHANGE UP (ref 2–14)
NEUTROPHILS # BLD AUTO: 7.71 K/UL — HIGH (ref 1.8–7.4)
NEUTROPHILS NFR BLD AUTO: 76.4 % — SIGNIFICANT CHANGE UP (ref 43–77)
NRBC # BLD: 0 /100 WBCS — SIGNIFICANT CHANGE UP (ref 0–0)
PLATELET # BLD AUTO: 234 K/UL — SIGNIFICANT CHANGE UP (ref 150–400)
POTASSIUM SERPL-MCNC: 4.4 MMOL/L — SIGNIFICANT CHANGE UP (ref 3.5–5.3)
POTASSIUM SERPL-SCNC: 4.4 MMOL/L — SIGNIFICANT CHANGE UP (ref 3.5–5.3)
PROT SERPL-MCNC: 7 G/DL — SIGNIFICANT CHANGE UP (ref 6–8.3)
RBC # BLD: 4.76 M/UL — SIGNIFICANT CHANGE UP (ref 3.8–5.2)
RBC # FLD: 13.2 % — SIGNIFICANT CHANGE UP (ref 10.3–14.5)
SARS-COV-2 RNA SPEC QL NAA+PROBE: NEGATIVE — SIGNIFICANT CHANGE UP
SODIUM SERPL-SCNC: 142 MMOL/L — SIGNIFICANT CHANGE UP (ref 135–145)
TROPONIN T SERPL-MCNC: 0.01 NG/ML — SIGNIFICANT CHANGE UP (ref 0–0.01)
WBC # BLD: 10.1 K/UL — SIGNIFICANT CHANGE UP (ref 3.8–10.5)
WBC # FLD AUTO: 10.1 K/UL — SIGNIFICANT CHANGE UP (ref 3.8–10.5)

## 2022-09-07 PROCEDURE — 87635 SARS-COV-2 COVID-19 AMP PRB: CPT

## 2022-09-07 PROCEDURE — 83874 ASSAY OF MYOGLOBIN: CPT

## 2022-09-07 PROCEDURE — 82553 CREATINE MB FRACTION: CPT

## 2022-09-07 PROCEDURE — 93005 ELECTROCARDIOGRAM TRACING: CPT

## 2022-09-07 PROCEDURE — 84484 ASSAY OF TROPONIN QUANT: CPT

## 2022-09-07 PROCEDURE — 80053 COMPREHEN METABOLIC PANEL: CPT

## 2022-09-07 PROCEDURE — 71045 X-RAY EXAM CHEST 1 VIEW: CPT

## 2022-09-07 PROCEDURE — 85025 COMPLETE CBC W/AUTO DIFF WBC: CPT

## 2022-09-07 PROCEDURE — 82962 GLUCOSE BLOOD TEST: CPT

## 2022-09-07 PROCEDURE — 97161 PT EVAL LOW COMPLEX 20 MIN: CPT

## 2022-09-07 PROCEDURE — 82550 ASSAY OF CK (CPK): CPT

## 2022-09-07 PROCEDURE — 99285 EMERGENCY DEPT VISIT HI MDM: CPT | Mod: 25

## 2022-09-07 PROCEDURE — 71045 X-RAY EXAM CHEST 1 VIEW: CPT | Mod: 26

## 2022-09-07 PROCEDURE — 36415 COLL VENOUS BLD VENIPUNCTURE: CPT

## 2022-09-07 RX ORDER — SODIUM CHLORIDE 9 MG/ML
1000 INJECTION, SOLUTION INTRAVENOUS ONCE
Refills: 0 | Status: COMPLETED | OUTPATIENT
Start: 2022-09-07 | End: 2022-09-07

## 2022-09-07 RX ORDER — AMLODIPINE BESYLATE 2.5 MG/1
5 TABLET ORAL ONCE
Refills: 0 | Status: COMPLETED | OUTPATIENT
Start: 2022-09-07 | End: 2022-09-07

## 2022-09-07 RX ADMIN — AMLODIPINE BESYLATE 5 MILLIGRAM(S): 2.5 TABLET ORAL at 14:35

## 2022-09-07 RX ADMIN — SODIUM CHLORIDE 1000 MILLILITER(S): 9 INJECTION, SOLUTION INTRAVENOUS at 14:34

## 2022-09-07 NOTE — PHYSICAL THERAPY INITIAL EVALUATION ADULT - ADDITIONAL COMMENTS
Pt. reports living in an elevator building, no NHUNG, uses SC at baseline for ambulation, has  1x/week. Orders food/ groceries.

## 2022-09-07 NOTE — ED PROVIDER NOTE - DR. NAME
Patient Instructions       - Rest.    - Drink plenty of fluids.     - Tylenol or Ibuprofen as directed as needed for fever/pain. Do not exceed tylenol 3,000 mg/day or ibuprofen 2,400 mg/day. Avoid tylenol if you have a history of liver disease. Do not take ibuprofen if you have a history of GI bleeding, kidney disease, or if you take blood thinners.      - You can take over-the-counter claritin, zyrtec, allegra, or xyzal as directed. These are antihistamines that can help with runny nose, nasal congestion, sneezing, and helps to dry up post-nasal drip, which usually causes sore throat and cough.              - If you do NOT have high blood pressure, you may use a decongestant form (D) of this medication (ie. Claritin- D, zyrtec-D, allegra-D) or if you do not take the D form, you can take sudafed (pseudoephedrine) over the counter, which is a decongestant. Do NOT take two decongestant (D) medications at the same time (such as mucinex-D and claritin-D or plain sudafed and claritin D)     - You can use Flonase (fluticasone) nasal spray as directed for sinus congestion and postnasal drip. This is a steroid nasal spray that works locally over time to decrease the inflammation in your nose/sinuses and help with allergic symptoms. This is not an quick- relief spray like afrin, but it works well if used daily.  Discontinue if you develop nose bleed  - use nasal saline prior to Flonase.  - Use Ocean Spray Nasal Saline 1-3 puffs each nostril every 2-3 hours then blow out onto tissue. This is to irrigate the nasal passage way to clear the sinus openings. Use until sinus problem resolved.     - warm tea with honey can help with cough. Honey is a natural cough suppressant.     - Dextromethorphan (DM) is a cough suppressant over the counter (ie. mucinex DM, robitussin, delsym; dayquil/nyquil has DM as well.)     - Follow up with your PCP or specialty clinic as directed in the next 1-2 weeks if not improved or as needed.  You can  call [phone] to schedule an appointment with the appropriate provider.       - Go to the ER if you develop new or worsening symptoms.      - You must understand that you have received an Urgent Care treatment only and that you may be released before all of your medical problems are known or treated.   - You, the patient, will arrange for follow up care as instructed.   - If your condition worsens or fails to improve we recommend that you receive another evaluation at the ER immediately or contact your PCP to discuss your concerns or return here.         Carlos Rizo)

## 2022-09-07 NOTE — ED PROVIDER NOTE - OBJECTIVE STATEMENT
78 y/o F with PMHx HTN and DM presents to ED c/o LE weakness and generalized fatigue since today. Pt normally ambulates with a cane but states today she was sitting on a public toilet and could not stand up afterwards. Contrary to triage, pt states the she was generally weak feeling but did not syncopize or fall. Pt began to feel lightheaded while straining during a BM and slowly lowered herself to the ground. Pt reports the sensation has since passed. Denies nausea, vomiting, focal weakness, vision changes, or headache. No saddle paresthesia, bowel/bladder symptoms, weakness, numbness, gait disturbance, fever, night sweats, weight loss.

## 2022-09-07 NOTE — ED PROVIDER NOTE - PATIENT PORTAL LINK FT
You can access the FollowMyHealth Patient Portal offered by [facility] by registering at the following website: [email or web address]. By joining [employer]’s FollowMyHealth portal, you will also be able to view your health information using other applications (apps) compatible with our system.

## 2022-09-07 NOTE — ED ADULT NURSE NOTE - OBJECTIVE STATEMENT
Patient presents AOX4 s/p syncopal episode while using the bathroom. Patient BIBEMS from [facility]-- patient states 'I went to the bathroom and the next thing I knew I woke up and was sitting on the floor." Patient denies CP/SOB. Speaking in full, complete sentences. Answering questions and following verbal commands appropriately. No obvious deformity or trauma noted

## 2022-09-07 NOTE — ED ADULT NURSE NOTE - CHPI ED NUR SYMPTOMS NEG
no back pain/no chest pain/no chills/no diaphoresis/no dizziness/no fever/no nausea/no shortness of breath/no vomiting

## 2022-09-07 NOTE — ED PROVIDER NOTE - PHYSICAL EXAMINATION
CONSTITUTIONAL: Non-toxic; in no apparent distress  HEAD: Normocephalic; atraumatic  EYES: PERRL; EOM intact   ENMT: External appears normal  NECK: Supple; non-tender  CARD: Normal S1, S2; no murmurs, rubs, or gallops  RESP: Normal chest excursion with respiration; breath sounds clear and equal bilaterally  ABD: Soft, non-distended; non-tender  EXT: Normal ROM in all four extremities; non-tender to palpation  SKIN: Warm, dry, no rash  NEURO:  No focal neurological deficiencies. No focal neurological deficiencies, CN 2-12 intact BL, no dysmetria, no pronator drift, strength/sensation intact throughout, normal cognition.

## 2022-09-07 NOTE — PHYSICAL THERAPY INITIAL EVALUATION ADULT - PERTINENT HX OF CURRENT PROBLEM, REHAB EVAL
Pt. is a 77 y.o female p/w sudden onset increased fatigue, gen weakness and LE weakness as of today s/p  an episode of difficulty standing up from seated position in a public bathroom. Denies fall/LOC today. Denies h/o recent falls, denies dizziness.

## 2022-09-07 NOTE — ED ADULT TRIAGE NOTE - GLASGOW COMA SCALE: BEST VERBAL RESPONSE, MLM
Chief Complaint   Patient presents with     Diabetes     Patient here for a diabetic check.     Lakeisha Jensen LPN at 9:36 AM on 6/6/2017.  
(V5) oriented

## 2022-09-07 NOTE — ED PROVIDER NOTE - NS ED ROS FT
CONSTITUTIONAL: No fever, no chills, no fatigue  EYES: No eye redness, no visual changes  ENT: No ear pain, no sore throat  CARDIOVASCULAR: No chest pain, no palpitations  RESPIRATORY: No cough, no SOB  GI: No abdominal pain, no nausea, no vomiting, no constipation, no diarrhea  GENITOURINARY: No dysuria, no frequency, no hematuria  MUSCULOSKELETAL: No back pain, no joint pain, no myalgias  SKIN: No rash, no peripheral edema  NEURO: No headache, no confusion +LE weakness, generalized fatigue    ALL OTHER SYSTEMS NEGATIVE.

## 2022-09-07 NOTE — ED ADULT TRIAGE NOTE - CHIEF COMPLAINT QUOTE
Pt presents reporting she became lightheaded while straining for a bowel movement. Pt reports she lost consciousness and woke up sitting upright on the floor next to the toilet. Pt reports this has happened to her before. Pt reports hx of DM, HTN, HLD.  w/ ems.

## 2022-09-28 ENCOUNTER — EMERGENCY (EMERGENCY)
Facility: HOSPITAL | Age: 77
LOS: 1 days | Discharge: HOME | End: 2022-09-28
Attending: EMERGENCY MEDICINE | Admitting: EMERGENCY MEDICINE
Payer: MEDICARE

## 2022-09-28 ENCOUNTER — APPOINTMENT (OUTPATIENT)
Dept: HEART AND VASCULAR | Facility: CLINIC | Age: 77
End: 2022-09-28

## 2022-09-28 ENCOUNTER — NON-APPOINTMENT (OUTPATIENT)
Age: 77
End: 2022-09-28

## 2022-09-28 VITALS
RESPIRATION RATE: 18 BRPM | OXYGEN SATURATION: 97 % | SYSTOLIC BLOOD PRESSURE: 136 MMHG | DIASTOLIC BLOOD PRESSURE: 82 MMHG | HEART RATE: 100 BPM

## 2022-09-28 VITALS
WEIGHT: 160.06 LBS | DIASTOLIC BLOOD PRESSURE: 88 MMHG | HEART RATE: 121 BPM | TEMPERATURE: 98 F | OXYGEN SATURATION: 95 % | RESPIRATION RATE: 16 BRPM | HEIGHT: 61 IN | SYSTOLIC BLOOD PRESSURE: 146 MMHG

## 2022-09-28 LAB
ALBUMIN SERPL ELPH-MCNC: 4.5 G/DL — SIGNIFICANT CHANGE UP (ref 3.3–5)
ALP SERPL-CCNC: 79 U/L — SIGNIFICANT CHANGE UP (ref 40–120)
ALT FLD-CCNC: 21 U/L — SIGNIFICANT CHANGE UP (ref 10–45)
ANION GAP SERPL CALC-SCNC: 17 MMOL/L — SIGNIFICANT CHANGE UP (ref 5–17)
APPEARANCE UR: CLEAR — SIGNIFICANT CHANGE UP
AST SERPL-CCNC: 31 U/L — SIGNIFICANT CHANGE UP (ref 10–40)
BACTERIA # UR AUTO: ABNORMAL /HPF
BASOPHILS # BLD AUTO: 0.04 K/UL — SIGNIFICANT CHANGE UP (ref 0–0.2)
BASOPHILS NFR BLD AUTO: 0.3 % — SIGNIFICANT CHANGE UP (ref 0–2)
BILIRUB SERPL-MCNC: 0.6 MG/DL — SIGNIFICANT CHANGE UP (ref 0.2–1.2)
BILIRUB UR-MCNC: ABNORMAL
BUN SERPL-MCNC: 36 MG/DL — HIGH (ref 7–23)
CALCIUM SERPL-MCNC: 9.9 MG/DL — SIGNIFICANT CHANGE UP (ref 8.4–10.5)
CHLORIDE SERPL-SCNC: 98 MMOL/L — SIGNIFICANT CHANGE UP (ref 96–108)
CK MB CFR SERPL CALC: 11.1 NG/ML — HIGH (ref 0–6.7)
CK SERPL-CCNC: 958 U/L — HIGH (ref 25–170)
CO2 SERPL-SCNC: 23 MMOL/L — SIGNIFICANT CHANGE UP (ref 22–31)
COLOR SPEC: YELLOW — SIGNIFICANT CHANGE UP
COMMENT - URINE: SIGNIFICANT CHANGE UP
CREAT SERPL-MCNC: 0.76 MG/DL — SIGNIFICANT CHANGE UP (ref 0.5–1.3)
DIFF PNL FLD: ABNORMAL
EGFR: 80 ML/MIN/1.73M2 — SIGNIFICANT CHANGE UP
EOSINOPHIL # BLD AUTO: 0 K/UL — SIGNIFICANT CHANGE UP (ref 0–0.5)
EOSINOPHIL NFR BLD AUTO: 0 % — SIGNIFICANT CHANGE UP (ref 0–6)
EPI CELLS # UR: SIGNIFICANT CHANGE UP /HPF (ref 0–5)
GLUCOSE SERPL-MCNC: 265 MG/DL — HIGH (ref 70–99)
GLUCOSE UR QL: >=1000
GRAN CASTS # UR COMP ASSIST: ABNORMAL /LPF
HCT VFR BLD CALC: 49.9 % — HIGH (ref 34.5–45)
HGB BLD-MCNC: 16.7 G/DL — HIGH (ref 11.5–15.5)
HYALINE CASTS # UR AUTO: SIGNIFICANT CHANGE UP /LPF (ref 0–2)
IMM GRANULOCYTES NFR BLD AUTO: 0.5 % — SIGNIFICANT CHANGE UP (ref 0–0.9)
KETONES UR-MCNC: 15 MG/DL
LEUKOCYTE ESTERASE UR-ACNC: ABNORMAL
LYMPHOCYTES # BLD AUTO: 1.2 K/UL — SIGNIFICANT CHANGE UP (ref 1–3.3)
LYMPHOCYTES # BLD AUTO: 7.6 % — LOW (ref 13–44)
MCHC RBC-ENTMCNC: 30.5 PG — SIGNIFICANT CHANGE UP (ref 27–34)
MCHC RBC-ENTMCNC: 33.5 GM/DL — SIGNIFICANT CHANGE UP (ref 32–36)
MCV RBC AUTO: 91.1 FL — SIGNIFICANT CHANGE UP (ref 80–100)
MONOCYTES # BLD AUTO: 1 K/UL — HIGH (ref 0–0.9)
MONOCYTES NFR BLD AUTO: 6.3 % — SIGNIFICANT CHANGE UP (ref 2–14)
NEUTROPHILS # BLD AUTO: 13.49 K/UL — HIGH (ref 1.8–7.4)
NEUTROPHILS NFR BLD AUTO: 85.3 % — HIGH (ref 43–77)
NITRITE UR-MCNC: NEGATIVE — SIGNIFICANT CHANGE UP
NRBC # BLD: 0 /100 WBCS — SIGNIFICANT CHANGE UP (ref 0–0)
PH UR: 6 — SIGNIFICANT CHANGE UP (ref 5–8)
PLATELET # BLD AUTO: 307 K/UL — SIGNIFICANT CHANGE UP (ref 150–400)
POTASSIUM SERPL-MCNC: 4.9 MMOL/L — SIGNIFICANT CHANGE UP (ref 3.5–5.3)
POTASSIUM SERPL-SCNC: 4.9 MMOL/L — SIGNIFICANT CHANGE UP (ref 3.5–5.3)
PROT SERPL-MCNC: 8 G/DL — SIGNIFICANT CHANGE UP (ref 6–8.3)
PROT UR-MCNC: 100 MG/DL
RBC # BLD: 5.48 M/UL — HIGH (ref 3.8–5.2)
RBC # FLD: 13.7 % — SIGNIFICANT CHANGE UP (ref 10.3–14.5)
RBC CASTS # UR COMP ASSIST: < 5 /HPF — SIGNIFICANT CHANGE UP
SARS-COV-2 RNA SPEC QL NAA+PROBE: NEGATIVE — SIGNIFICANT CHANGE UP
SODIUM SERPL-SCNC: 138 MMOL/L — SIGNIFICANT CHANGE UP (ref 135–145)
SP GR SPEC: >=1.03 — SIGNIFICANT CHANGE UP (ref 1–1.03)
TROPONIN T SERPL-MCNC: <0.01 NG/ML — SIGNIFICANT CHANGE UP (ref 0–0.01)
UROBILINOGEN FLD QL: 0.2 E.U./DL — SIGNIFICANT CHANGE UP
WBC # BLD: 15.81 K/UL — HIGH (ref 3.8–10.5)
WBC # FLD AUTO: 15.81 K/UL — HIGH (ref 3.8–10.5)
WBC UR QL: > 10 /HPF

## 2022-09-28 PROCEDURE — G2066: CPT

## 2022-09-28 PROCEDURE — 87086 URINE CULTURE/COLONY COUNT: CPT

## 2022-09-28 PROCEDURE — 93010 ELECTROCARDIOGRAM REPORT: CPT

## 2022-09-28 PROCEDURE — 87186 SC STD MICRODIL/AGAR DIL: CPT

## 2022-09-28 PROCEDURE — 87635 SARS-COV-2 COVID-19 AMP PRB: CPT

## 2022-09-28 PROCEDURE — 70450 CT HEAD/BRAIN W/O DYE: CPT | Mod: 26,MA

## 2022-09-28 PROCEDURE — 97161 PT EVAL LOW COMPLEX 20 MIN: CPT

## 2022-09-28 PROCEDURE — 70450 CT HEAD/BRAIN W/O DYE: CPT | Mod: MA

## 2022-09-28 PROCEDURE — 82962 GLUCOSE BLOOD TEST: CPT

## 2022-09-28 PROCEDURE — 99285 EMERGENCY DEPT VISIT HI MDM: CPT | Mod: FS,25

## 2022-09-28 PROCEDURE — 71045 X-RAY EXAM CHEST 1 VIEW: CPT | Mod: 26

## 2022-09-28 PROCEDURE — 82550 ASSAY OF CK (CPK): CPT

## 2022-09-28 PROCEDURE — 96374 THER/PROPH/DIAG INJ IV PUSH: CPT

## 2022-09-28 PROCEDURE — 85025 COMPLETE CBC W/AUTO DIFF WBC: CPT

## 2022-09-28 PROCEDURE — 80053 COMPREHEN METABOLIC PANEL: CPT

## 2022-09-28 PROCEDURE — 99285 EMERGENCY DEPT VISIT HI MDM: CPT | Mod: 25

## 2022-09-28 PROCEDURE — 84484 ASSAY OF TROPONIN QUANT: CPT

## 2022-09-28 PROCEDURE — 93298 REM INTERROG DEV EVAL SCRMS: CPT

## 2022-09-28 PROCEDURE — 81001 URINALYSIS AUTO W/SCOPE: CPT

## 2022-09-28 PROCEDURE — 82553 CREATINE MB FRACTION: CPT

## 2022-09-28 PROCEDURE — 71045 X-RAY EXAM CHEST 1 VIEW: CPT

## 2022-09-28 PROCEDURE — 36415 COLL VENOUS BLD VENIPUNCTURE: CPT

## 2022-09-28 RX ORDER — CEFTRIAXONE 500 MG/1
1000 INJECTION, POWDER, FOR SOLUTION INTRAMUSCULAR; INTRAVENOUS ONCE
Refills: 0 | Status: COMPLETED | OUTPATIENT
Start: 2022-09-28 | End: 2022-09-28

## 2022-09-28 RX ORDER — SODIUM CHLORIDE 9 MG/ML
1000 INJECTION INTRAMUSCULAR; INTRAVENOUS; SUBCUTANEOUS ONCE
Refills: 0 | Status: COMPLETED | OUTPATIENT
Start: 2022-09-28 | End: 2022-09-28

## 2022-09-28 RX ADMIN — CEFTRIAXONE 100 MILLIGRAM(S): 500 INJECTION, POWDER, FOR SOLUTION INTRAMUSCULAR; INTRAVENOUS at 14:37

## 2022-09-28 RX ADMIN — SODIUM CHLORIDE 1000 MILLILITER(S): 9 INJECTION INTRAMUSCULAR; INTRAVENOUS; SUBCUTANEOUS at 11:09

## 2022-09-28 NOTE — ED PROVIDER NOTE - PHYSICAL EXAMINATION
CONSTITUTIONAL: elderly female. lying in bed  HEAD: Normocephalic; atraumatic.   EYES: PERRL; EOM intact; conjunctiva and sclera clear  ENT: normal nose; no rhinorrhea; normal pharynx with no erythema or lesions.   NECK: Supple; non-tender; no LAD  CARDIOVASCULAR: Normal S1, S2; No audible murmurs. Regular rate and rhythm.   RESPIRATORY: Breathing easily; breath sounds clear and equal bilaterally; no wheezes, rhonchi, or rales.  GI: Soft; non-distended; non-tender; no palpable organomegaly.   MSK: FROM at all extremities, normal tone   EXT: No cyanosis or edema; N/V intact  SKIN: old abrasions to R elbow and b/l knees   NEURO: A & O x 3; face symmetric; grossly unremarkable.   PSYCHOLOGICAL: The patient’s mood and manner are appropriate.

## 2022-09-28 NOTE — PHYSICAL THERAPY INITIAL EVALUATION ADULT - TRANSFER TRAINING, PT EVAL
Pt will be able to perform sit to/from stand transfers with appropriate AD and modified independence in 1 week

## 2022-09-28 NOTE — ED ADULT TRIAGE NOTE - NS ED TRIAGE AVPU SCALE
Teddy Beaver arrived to room # [id number]. Presented with: acute respiratory failure with hypoxia  Mental Status: Patient is oriented, alert, coherent, logical, thought processes intact and able to concentrate and follow conversation. Vitals:    02/08/22 2000   BP:    Pulse: 69   Resp:    Temp:    SpO2:      Patient safety contract and falls prevention contract reviewed with patient Yes. Oriented Patient to room. Call light within reach. Yes.   Needs, issues or concerns expressed at this time: no.      Electronically signed by Julia Jackson RN on 2/8/2022 at 11:47 PM Alert-The patient is alert, awake and responds to voice. The patient is oriented to time, place, and person. The triage nurse is able to obtain subjective information.

## 2022-09-28 NOTE — ED PROVIDER NOTE - PROGRESS NOTE DETAILS
UA +uti, given ceftriaxone will dc with cefpodoxime. Seen by PT recommended LUBA, pt amenable. SW arranged pt to go directly to [facility]

## 2022-09-28 NOTE — ED PROVIDER NOTE - NSFOLLOWUPINSTRUCTIONS_ED_ALL_ED_FT
Please given cefpodoxime 100mg twice daily for 7 days.     Urinary Tract Infection    A urinary tract infection (UTI) is an infection of any part of the urinary tract, which includes the kidneys, ureters, bladder, and urethra. Risk factors include ignoring your need to urinate, wiping back to front if female, being an uncircumcised male, and having diabetes or a weak immune system. Symptoms include frequent urination, pain or burning with urination, foul smelling urine, cloudy urine, pain in the lower abdomen, blood in the urine, and fever. If you were prescribed an antibiotic medicine, take it as told by your health care provider. Do not stop taking the antibiotic even if you start to feel better.    SEEK IMMEDIATE MEDICAL CARE IF YOU HAVE ANY OF THE FOLLOWING SYMPTOMS: severe back or abdominal pain, fever, inability to keep fluids or medicine down, dizziness/lightheadedness, or a change in mental status.

## 2022-09-28 NOTE — ED PROVIDER NOTE - OBJECTIVE STATEMENT
78 yo F with pmh of HTN and DM found on floor of her apt this morning by her . Pt states she got up to get her meds early this morning and felt shaky and put herself on the floor. Denies fall. Pt could not get up. Pt states she is frequently shaky when she does not take her medications. Denies HA, dizziness, cp, sob, n/v/d, abd pain, dysuria. Pt states she lives alone and takes care of herself. I spoke with pts HCP her nephew Channing Gallegos [phone] who states pt has been shaky for about a year, he has scheduled her for a geriatric appt and an appt with her pcp. As per EMS BS was 44 on arrival and pt did not get anything, now BG elevated unclear if initially glucose was incorrect

## 2022-09-28 NOTE — ED ADULT TRIAGE NOTE - BEFAST ARM SIDE DRIFT
Patient: Valentina Iraheta MRN: [id number]  SSN: [id number]    YOB: 1946  Age: 75 y.o. Sex: female        Subjective:     Chief Complaint   Patient presents with    Follow Up Chronic Condition    Rash     bilateral feet       HPI: she is a 75y.o. year old female who presents with daughter for follow up of her multiple chronic medical conditions. Patient with hx of T2D with neuropathy, PE, HTN, HLD, lymphedema, incontinence, eczema and hydradenitis. Patient denies F/C, HA, dizziness, SOB, CP, abdominal pain, acute myalgias or arthralgias. Encounter Diagnoses   Name Primary?  Acute cystitis with hematuria Yes    Controlled type 2 diabetes mellitus without complication, without long-term current use of insulin ([facility]     Essential hypertension     Neuropathy due to type 2 diabetes mellitus ([facility]     Hyperlipidemia, unspecified hyperlipidemia type     Female stress incontinence     Lymphedema     Other chronic pulmonary embolism without acute cor pulmonale (HCC)     Eczema, unspecified type     Non-seasonal allergic rhinitis     Hydradenitis     Recurrent depression (HCC)        Anemia:    Lab Results   Component Value Date/Time    WBC 12.1 (H) 10/23/2019 02:41 PM    HGB 10.8 (L) 10/23/2019 02:41 PM    HCT 32.7 (L) 10/23/2019 02:41 PM    PLATELET 542 (H) 00/59/9962 02:41 PM    MCV 88 10/23/2019 02:41 PM     Diabetes: This patient is being treating under a comprehensive plan of care for diabetes. Overall the patient feels well with good energy level. Insulin dependence: no   Pertinent Labs:   Lab Results   Component Value Date/Time    Hemoglobin A1c 6.4 (H) 09/17/2019 11:31 AM      Body mass index is 27.85 kg/m².      Lab Results   Component Value Date/Time    LDL, calculated 101 (H) 09/17/2019 11:31 AM        Wt Readings from Last 3 Encounters:   02/27/20 147 lb 6.4 oz (66.9 kg)   10/23/19 150 lb (68 kg)   09/16/19 149 lb (67.6 kg)        Social History     Tobacco Use   Smoking Status Never Smoker   Smokeless Tobacco Never Used        Medications, diet and exercise as means of diabetic control with a goal of an A1C of less than 7.0% discussed. Diabetic foot care and annual eye exam discussed as well. Check blood sugars while fasting just before breakfast on most days and occasionally before dinner. Write down readings in a diabetic log book and bring them to the next visit. Call the office for fasting sugars over 200 or below 75 on two or more occasions. Call immediately if having symptoms of high sugar (frequent urination, always thirsty) or low sugar (dizzy, lethargic, sweaty, nauseated, headache). Our overall goal is to reduce or eliminate the long term consequences of poorly controlled diabetes. Patient expresses understanding and agreement with our plan of care. Hypertension:  The patient reports:  taking medications as instructed, no medication side effects noted, no TIA's, no chest pain on exertion, no dyspnea on exertion, noting swelling of ankles. BP Readings from Last 3 Encounters:   02/27/20 125/75   10/23/19 107/65   09/16/19 119/72     Lab Results   Component Value Date/Time    Sodium 140 10/23/2019 02:41 PM    Potassium 4.2 10/23/2019 02:41 PM    Chloride 100 10/23/2019 02:41 PM    CO2 25 10/23/2019 02:41 PM    Anion gap 5 01/08/2015 02:17 AM    Glucose 108 (H) 10/23/2019 02:41 PM    BUN 9 10/23/2019 02:41 PM    Creatinine 0.79 10/23/2019 02:41 PM    BUN/Creatinine ratio 11 (L) 10/23/2019 02:41 PM    GFR est AA 85 10/23/2019 02:41 PM    GFR est non-AA 74 10/23/2019 02:41 PM    Calcium 9.4 10/23/2019 02:41 PM     Patient advised to log blood pressures at home weekly and bring to next visit. Call office as soon as possible if BP's over 140/90 on multiple occasions or with symptoms of dizziness, chest pain, shortness of breath, headache or ankle swelling. Our goal is to normalize the blood pressure to decrease the risks of strokes and heart attacks.  The patient is in agreement with the plan. Current and past medical information:    Current Medications after this visit[de-identified]     Current Outpatient Medications   Medication Sig    ferrous sulfate 325 mg (65 mg iron) tablet Take 1 Tab by mouth daily.  apixaban (ELIQUIS) 5 mg tablet Take 1 Tab by mouth every twelve (12) hours.  atorvastatin (LIPITOR) 80 mg tablet Take 1 Tab by mouth daily.  oxybutynin (DITROPAN) 5 mg tablet Take 1 Tab by mouth three (3) times daily.  bumetanide (BUMEX) 2 mg tablet Take 1 Tab by mouth daily.  triamcinolone acetonide (KENALOG) 0.1 % ointment Apply to bilateral medial feet twice daily, use thin layer    fluticasone propionate (FLONASE) 50 mcg/actuation nasal spray 2 Sprays by Both Nostrils route daily.  gabapentin (NEURONTIN) 100 mg capsule Take 1 Cap by mouth three (3) times daily as needed for Pain.  clindamycin (CLEOCIN T) 1 % lotion Apply to axilla twice a day    chlorhexidine (HIBICLENS) 4 % liquid Apply to axilla daily.  nitrofurantoin (MACRODANTIN) 100 mg capsule Take 1 Cap by mouth two (2) times a day for 10 days.  ergocalciferol (ERGOCALCIFEROL) 50,000 unit capsule Take 1 Cap by mouth every seven (7) days. Indications: low vitamin D levels    zinc oxide 20 % ointment Apply  to affected area as needed for Skin Irritation.  OTHER Shower Chair- Use As Directed    OTHER Pull Ups     No current facility-administered medications for this visit.         Patient Active Problem List    Diagnosis Date Noted    Controlled type 2 diabetes mellitus without complication, without long-term current use of insulin ([facility] 07/30/2018    Recurrent depression ([facility] 01/11/2018    Advanced care planning/counseling discussion 06/13/2016    H/O Pulmonary embolism (1/2015) 01/05/2015    Edema 08/27/2014    Hydradenitis 02/01/2012    HTN (hypertension) 01/21/2011    AR (allergic rhinitis) 05/21/2010    Eczema 05/21/2010    Depression 05/21/2010    Female stress incontinence 05/21/2010    Dyslipidemia 05/21/2010       Past Medical History:   Diagnosis Date    Arthritis     Diabetes ([facility]     states she is \"pre-diabetic\", diet controlled    frequency     H/O blood clots     right side of body    Heart attack ([facility] 1/2014    Hydradenitis 2/1/2012    Hypercholesterolemia     Hypertension     Ill-defined condition     edema of legs       Allergies   Allergen Reactions    Ciprofloxacin Other (comments)     Vomiting    Doxycycline Other (comments)     Vomiting    Pcn [Penicillins] Rash       History reviewed. No pertinent surgical history. Social History     Socioeconomic History    Marital status: LEGALLY      Spouse name: Not on file    Number of children: Not on file    Years of education: Not on file    Highest education level: Not on file   Tobacco Use    Smoking status: Never Smoker    Smokeless tobacco: Never Used   Substance and Sexual Activity    Alcohol use: No     Alcohol/week: 0.0 standard drinks    Drug use: No    Sexual activity: Never         Objective:     Review of Systems:  Constitutional: Negative for fatigue or malaise  Derm: hx of hydradenitis b/l axilla and eczema  HEENT: Negative for acute hearing or vision changes  Cardiovascular: Negative for dizziness, chest pain or palpitations  Respiratory: Negative for cough, wheezing or SOB  Gastrointestinal:  Negative for nausea or abdominal pain  Genital/urinary: hx of incontinence, Negative for dysuria   Musculoskeletal: Negative for acute myalgias or arthralgias   Neurological: Negative for headache, weakness or paresthesia  Psychological: Negative for depression or anxiety      Vitals:    02/27/20 1316   BP: 125/75   Pulse: 83   Resp: 16   Temp: 98 °F (36.7 °C)   TempSrc: Oral   SpO2: 100%   Weight: 147 lb 6.4 oz (66.9 kg)   Height: 5' 1\" (1.549 m)      Body mass index is 27.85 kg/m².     Physical Exam:  Constitutional: well developed, well nourished, in no acute distress  Skin: multiple nodules b/l axilla, hyperpigmented plaques b/l medial feet  Head: normocephalic, atraumatic  Eyes: sclera clear, EOMI, PERRL  Neck: normal range of motion  Cardiovascular: normal S1, S2, regular rate and rhythm, 3+ pedal edema  Respiratory: clear to auscultation bilaterally with symmetrical effort  Abdomen: soft, normal bowel sounds  Extremities: full range of motion, antalgic gait  Neurology: normal strength and sensation  Psych: active, alert and oriented, affect appropriate       Assessment and orders:       ICD-10-CM ICD-9-CM    1. Acute cystitis with hematuria N30.01 595.0 CULTURE, URINE      AMB POC URINALYSIS DIP STICK AUTO W/O MICRO      AMB POC URINE, MICROALBUMIN, SEMIQUANT (3 RESULTS)   2. Controlled type 2 diabetes mellitus without complication, without long-term current use of insulin ([facility]) E11.9 250.00 AMB POC URINALYSIS DIP STICK AUTO W/O MICRO      AMB POC URINE, MICROALBUMIN, SEMIQUANT (3 RESULTS)   3. Essential hypertension I10 401.9 bumetanide (BUMEX) 2 mg tablet   4. Neuropathy due to type 2 diabetes mellitus ([facility]) E11.40 250.60 gabapentin (NEURONTIN) 100 mg capsule     357.2    5. Hyperlipidemia, unspecified hyperlipidemia type E78.5 272.4 atorvastatin (LIPITOR) 80 mg tablet   6. Female stress incontinence N39.3 625.6 oxybutynin (DITROPAN) 5 mg tablet   7. Lymphedema I89.0 457.1 bumetanide (BUMEX) 2 mg tablet   8. Other chronic pulmonary embolism without acute cor pulmonale ([facility]) I27.82 416.2 apixaban (ELIQUIS) 5 mg tablet   9. Eczema, unspecified type L30.9 692.9 triamcinolone acetonide (KENALOG) 0.1 % ointment   10. Non-seasonal allergic rhinitis J30.89 477.8 fluticasone propionate (FLONASE) 50 mcg/actuation nasal spray   11. Hydradenitis L73.2 705.83 clindamycin (CLEOCIN T) 1 % lotion   12.  Recurrent depression ([facility] F33.9 296.30         Plan of care:  Patient with acute problem and multiple chronic co-morbidities requiring comprehensive review of medical history, medications and complex medical decision making. Diagnoses were discussed in detail with patient. Medication risks/benefits/side effects discussed with patient. All of the patient's questions were addressed and answered to apparent satisfaction. The patient understands and agrees with our plan of care. The patient knows to call back if they have questions about the plan of care or if symptoms change. The patient received an After-Visit Summary which contains VS, diagnoses, orders, allergy and medication lists. Over half of the 40 minutes face to face with Heber Pollock consisted of counseling and discussing treatment plans in reference to her acute cystitis and multiple chronic co-morbidities. Patient Care Team:  Nicanor Salcido MD as PCP - General (Family Practice)  Nicanor Salcido MD as PCP - [facility] Empaneled Provider  Juan José Rios MD (Cardiology)  Avni Natarajan DPM (Podiatry)  Dominique Tyson MD (General Surgery)  Alexandra King OD [facility])    Follow-up and Dispositions    · Return in about 4 months (around 6/27/2020). No future appointments.     Signed By: Karen Gonzalez MD     February 27, 2020 No

## 2022-09-28 NOTE — ED PROVIDER NOTE - NS ED ATTENDING STATEMENT MOD
This was a shared visit with the TOBY. I reviewed and verified the documentation and independently performed the documented:

## 2022-09-28 NOTE — ED PROVIDER NOTE - PATIENT PORTAL LINK FT
You can access the FollowMyHealth Patient Portal offered by [facility] by registering at the following website: [email or web address]. By joining [facility]’s FollowMyHealth portal, you will also be able to view your health information using other applications (apps) compatible with our system.

## 2022-09-28 NOTE — ED ADULT NURSE NOTE - NSIMPLEMENTINTERV_GEN_ALL_ED
Implemented All Fall with Harm Risk Interventions:  [place] to call system. Call bell, personal items and telephone within reach. Instruct patient to call for assistance. Room bathroom lighting operational. Non-slip footwear when patient is off stretcher. Physically safe environment: no spills, clutter or unnecessary equipment. Stretcher in lowest position, wheels locked, appropriate side rails in place. Provide visual cue, wrist band, yellow gown, etc. Monitor gait and stability. Monitor for mental status changes and reorient to person, place, and time. Review medications for side effects contributing to fall risk. Reinforce activity limits and safety measures with patient and family. Provide visual clues: red socks.

## 2022-09-28 NOTE — PHYSICAL THERAPY INITIAL EVALUATION ADULT - ADDITIONAL COMMENTS
Pt reports living alone. Reports ambulating with straight cane, but has difficulty 2/2 BLE shakiness. Reports having  come once per week on Wednesdays. Reports living in apartment without NHUNG.

## 2022-09-28 NOTE — ED ADULT TRIAGE NOTE - BEFAST BALANCE
"  Outpatient Physical Therapy  DAILY TREATMENT     [facility] [address]  [address]  [place] [address]  Phone:  [phone]  Fax:  [phone]    Date: 02/08/2019    Patient: Berlin Clifford  YOB: 1955  MRN: [id number]     Time Calculation  Start time: 1329  Stop time: 1419 Time Calculation (min): 50 minutes     Chief Complaint: Back Problem    Visit #: 3    SUBJECTIVE:  \"I feel better.\"  Feels like the traction helps.  Also has not been working as much.  R LBP \"mild\" today.     OBJECTIVE:      Therapeutic Exercises (CPT 67773):     1. NuStep, L2 x 5 min    2. SKTC, x 1 min ea    3. LTR, x 20 ea    4. Cat/cow, x 10 ea way    5. Kyle pose, x 30\" ea way    6. Ball rolls, x 20, focus on hip hinge    7. Ball bridge, x2 min hold    8. L SB trunk stretch, x 1 min      Therapeutic Exercise Summary: Brief IASTM to the R lumbar and lat. GII-III lumbar rotation.     Therapeutic Treatments and Modalities:     1. Mechanical Traction (CPT 43364), 70/40#, 60/20\" with MH x 15 min    Time-based treatments/modalities:  Therapeutic exercise minutes (CPT 47346): 30 minutes       ASSESSMENT:   Response to treatment: Pain reduced from 3/10 to 1/10 with tx.  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     PLAN/RECOMMENDATIONS:   Plan for treatment: therapy treatment to continue next visit.  Planned interventions for next visit: continue with current treatment.      " No

## 2022-09-28 NOTE — ED ADULT TRIAGE NOTE - CHIEF COMPLAINT QUOTE
Pt BIBEMS from home c/o AMS, unknown last well. As per EMS, "The  arrived and said she was confused." Pt was last seen by nephew yesterday and pt was well. Pt reported BS 44 in field. Pt AAOx3, but slow to answer. Denies any other sx. No signs of injury noted. Healing bruises noted to bilateral knees.

## 2022-09-28 NOTE — ED PROVIDER NOTE - ATTENDING APP SHARED VISIT CONTRIBUTION OF CARE
77F PMH HTN, DM p/w lightheaded/shakiness.   Vitals wnl, exam as above. EMS reportedly noted FSG 40s - however was not given ANY sugar or meds and glucose in ED is 200s. Pt does not recall which meds she is on at home.   Was in ED 9/7/22 for fatigue/lightheaded. Had labs/CXR w/ no significant abnormalities. Had PT eval and dc'd from ED.   Today, WBC 15, Hgb 16.7, BUN 36, , other labs grossly wnl. Glucose remains mildly hyperglycemic and not hypoglycemic. UA w/ trace leuk esterase, >10 WBCs. COVID neg. CTH showing "Mild right parietal scalp soft tissue swelling. No intracranial hemorrhage or calvarial fracture."  CXR w/ no acute pathology. Pt remains well appearing and wants to go home. d/w SW.   Will tx for UTI, given IVF for likely mild dehydration.   SW/PT eval pending. Will reassess.

## 2022-09-28 NOTE — PHYSICAL THERAPY INITIAL EVALUATION ADULT - PERTINENT HX OF CURRENT PROBLEM, REHAB EVAL
76 yo F with pmh of HTN and DM found on floor of her apt this morning by her . Pt states she got up to get her meds early this morning and felt shaky and put herself on the floor. Denies fall. Pt could not get up. Pt states she is frequently shaky when she does not take her medications. Denies HA, dizziness, cp, sob, n/v/d, abd pain, dysuria. Pt states she lives alone and takes care of herself. I spoke with pts HCP her nephew Channing Gallegos [phone] who states pt has been shaky for about a year, he has scheduled her for a geriatric appt and an appt with her pcp.

## 2022-09-28 NOTE — ED ADULT NURSE NOTE - OBJECTIVE STATEMENT
78 y/o female found on floor by  as per EMS, c/o AMS, as per EMS, "The  arrived and said she was confused." Pt was last seen by nephew yesterday and pt was well. current , Pt AAOx3, but slow to answer. Denies any other sx. No signs of injury noted, abrasions noted on right elbow, b/l knees due to recent fall as per pt. pt notes to have tremors to b/l hands.

## 2022-09-28 NOTE — ED PROVIDER NOTE - CLINICAL SUMMARY MEDICAL DECISION MAKING FREE TEXT BOX
76 yo F with pmh of HTN and DM found on floor of her apt this morning by her . Pt states she got up to get her meds early this morning and felt shaky and put herself on the floor. Denies fall. Pt could not get up. Pt states she is frequently shaky when she does not take her medications. Denies HA, dizziness, cp, sob, n/v/d, abd pain, dysuria. Pt states she lives alone and takes care of herself. As per EMS BS was 44 on arrival and pt did not get anything, now BG elevated unclear if initially glucose was incorrect. Neurologically intact. old abrasions. sinus tachy @ 108. Labs, CTH, CXR, UA, fluids   Pt recently in ED and had a PT eval which cleared pt for dc with her cane

## 2022-09-28 NOTE — PHYSICAL THERAPY INITIAL EVALUATION ADULT - NSPTDISCHREC_GEN_A_CORE
Subacute Rehab // If pt were to return home, pt to require home physical therapy, rolling walker, and supervision for OOB mobility

## 2022-09-30 DIAGNOSIS — Z79.84 LONG TERM (CURRENT) USE OF ORAL HYPOGLYCEMIC DRUGS: ICD-10-CM

## 2022-09-30 DIAGNOSIS — I10 ESSENTIAL (PRIMARY) HYPERTENSION: ICD-10-CM

## 2022-09-30 DIAGNOSIS — N39.0 URINARY TRACT INFECTION, SITE NOT SPECIFIED: ICD-10-CM

## 2022-09-30 DIAGNOSIS — E11.9 TYPE 2 DIABETES MELLITUS WITHOUT COMPLICATIONS: ICD-10-CM

## 2022-09-30 DIAGNOSIS — R00.0 TACHYCARDIA, UNSPECIFIED: ICD-10-CM

## 2022-09-30 DIAGNOSIS — Z20.822 CONTACT WITH AND (SUSPECTED) EXPOSURE TO COVID-19: ICD-10-CM

## 2022-09-30 DIAGNOSIS — R41.82 ALTERED MENTAL STATUS, UNSPECIFIED: ICD-10-CM

## 2022-09-30 DIAGNOSIS — E03.9 HYPOTHYROIDISM, UNSPECIFIED: ICD-10-CM

## 2022-09-30 LAB
-  AMPICILLIN/SULBACTAM: SIGNIFICANT CHANGE UP
-  AMPICILLIN: SIGNIFICANT CHANGE UP
-  CEFAZOLIN: SIGNIFICANT CHANGE UP
-  CEFTRIAXONE: SIGNIFICANT CHANGE UP
-  CIPROFLOXACIN: SIGNIFICANT CHANGE UP
-  ERTAPENEM: SIGNIFICANT CHANGE UP
-  GENTAMICIN: SIGNIFICANT CHANGE UP
-  NITROFURANTOIN: SIGNIFICANT CHANGE UP
-  PIPERACILLIN/TAZOBACTAM: SIGNIFICANT CHANGE UP
-  TOBRAMYCIN: SIGNIFICANT CHANGE UP
-  TRIMETHOPRIM/SULFAMETHOXAZOLE: SIGNIFICANT CHANGE UP
CULTURE RESULTS: SIGNIFICANT CHANGE UP
METHOD TYPE: SIGNIFICANT CHANGE UP
ORGANISM # SPEC MICROSCOPIC CNT: SIGNIFICANT CHANGE UP
ORGANISM # SPEC MICROSCOPIC CNT: SIGNIFICANT CHANGE UP
SPECIMEN SOURCE: SIGNIFICANT CHANGE UP

## 2022-11-02 ENCOUNTER — APPOINTMENT (OUTPATIENT)
Dept: HEART AND VASCULAR | Facility: CLINIC | Age: 77
End: 2022-11-02

## 2022-11-02 ENCOUNTER — FORM ENCOUNTER (OUTPATIENT)
Age: 77
End: 2022-11-02

## 2022-12-05 ENCOUNTER — NON-APPOINTMENT (OUTPATIENT)
Age: 77
End: 2022-12-05

## 2022-12-07 ENCOUNTER — APPOINTMENT (OUTPATIENT)
Dept: HEART AND VASCULAR | Facility: CLINIC | Age: 77
End: 2022-12-07

## 2022-12-07 VITALS — SYSTOLIC BLOOD PRESSURE: 111 MMHG | HEART RATE: 84 BPM | DIASTOLIC BLOOD PRESSURE: 66 MMHG

## 2022-12-07 PROCEDURE — 93291 INTERROG DEV EVAL SCRMS IP: CPT

## 2022-12-07 PROCEDURE — 99214 OFFICE O/P EST MOD 30 MIN: CPT | Mod: 25

## 2022-12-07 RX ORDER — METFORMIN HYDROCHLORIDE 1000 MG/1
1000 TABLET, COATED ORAL
Qty: 180 | Refills: 0 | Status: DISCONTINUED | COMMUNITY
Start: 2022-07-19

## 2022-12-07 RX ORDER — QUINAPRIL HYDROCHLORIDE 40 MG/1
40 TABLET, FILM COATED ORAL
Qty: 90 | Refills: 0 | Status: DISCONTINUED | COMMUNITY
Start: 2022-07-19

## 2022-12-07 RX ORDER — SIMVASTATIN 40 MG/1
40 TABLET, FILM COATED ORAL
Qty: 90 | Refills: 0 | Status: DISCONTINUED | COMMUNITY
Start: 2022-05-12

## 2022-12-07 RX ORDER — GLIMEPIRIDE 2 MG/1
2 TABLET ORAL
Qty: 90 | Refills: 0 | Status: DISCONTINUED | COMMUNITY
Start: 2022-02-19

## 2022-12-07 RX ORDER — METOPROLOL SUCCINATE 50 MG/1
50 TABLET, EXTENDED RELEASE ORAL
Qty: 90 | Refills: 0 | Status: DISCONTINUED | COMMUNITY
Start: 2022-07-19

## 2022-12-07 RX ORDER — LEVOTHYROXINE SODIUM 100 UG/1
100 TABLET ORAL
Qty: 30 | Refills: 0 | Status: ACTIVE | COMMUNITY
Start: 2022-11-29

## 2022-12-07 RX ORDER — AMLODIPINE BESYLATE 5 MG/1
5 TABLET ORAL
Qty: 90 | Refills: 0 | Status: ACTIVE | COMMUNITY
Start: 2021-10-12

## 2022-12-07 RX ORDER — METOPROLOL SUCCINATE 25 MG/1
25 TABLET, EXTENDED RELEASE ORAL
Qty: 90 | Refills: 0 | Status: ACTIVE | COMMUNITY
Start: 2021-10-07

## 2022-12-07 RX ORDER — DAPAGLIFLOZIN 10 MG/1
10 TABLET, FILM COATED ORAL
Qty: 90 | Refills: 0 | Status: DISCONTINUED | COMMUNITY
Start: 2021-11-30

## 2022-12-07 RX ORDER — SEMAGLUTIDE 1.34 MG/ML
2 INJECTION, SOLUTION SUBCUTANEOUS
Qty: 4 | Refills: 0 | Status: DISCONTINUED | COMMUNITY
Start: 2022-03-07

## 2022-12-12 ENCOUNTER — INPATIENT (INPATIENT)
Facility: HOSPITAL | Age: 77
LOS: 0 days | Discharge: HOME | DRG: 229 | End: 2022-12-13
Attending: INTERNAL MEDICINE | Admitting: INTERNAL MEDICINE
Payer: COMMERCIAL

## 2022-12-12 VITALS
SYSTOLIC BLOOD PRESSURE: 152 MMHG | DIASTOLIC BLOOD PRESSURE: 67 MMHG | WEIGHT: 121.7 LBS | HEIGHT: 61 IN | OXYGEN SATURATION: 99 % | HEART RATE: 70 BPM | RESPIRATION RATE: 18 BRPM

## 2022-12-12 LAB
GLUCOSE BLDC GLUCOMTR-MCNC: 102 MG/DL — HIGH (ref 70–99)
ISTAT INR: 1.2 — HIGH (ref 0.88–1.16)
ISTAT PT: 14.3 SEC — HIGH (ref 10–12.9)
ISTAT VENOUS BE: -5 MMOL/L — LOW (ref -2–3)
ISTAT VENOUS GLUCOSE: 193 MG/DL — HIGH (ref 70–99)
ISTAT VENOUS HCO3: 22 MMOL/L — LOW (ref 23–28)
ISTAT VENOUS HEMATOCRIT: 33 % — LOW (ref 34.5–45)
ISTAT VENOUS HEMOGLOBIN: 11.2 GM/DL — LOW (ref 11.5–15.5)
ISTAT VENOUS IONIZED CALCIUM: 1.3 MMOL/L — SIGNIFICANT CHANGE UP (ref 1.12–1.3)
ISTAT VENOUS PCO2: 44 MMHG — SIGNIFICANT CHANGE UP (ref 41–51)
ISTAT VENOUS PH: 7.3 — LOW (ref 7.31–7.41)
ISTAT VENOUS PO2: <66 MMHG — LOW (ref 35–40)
ISTAT VENOUS POTASSIUM: 4.7 MMOL/L — SIGNIFICANT CHANGE UP (ref 3.5–5.3)
ISTAT VENOUS SO2: 36 % — SIGNIFICANT CHANGE UP
ISTAT VENOUS SODIUM: 139 MMOL/L — SIGNIFICANT CHANGE UP (ref 135–145)
ISTAT VENOUS TCO2: 23 MMOL/L — SIGNIFICANT CHANGE UP (ref 22–31)
POCT ISTAT CREATININE: 1.3 MG/DL — SIGNIFICANT CHANGE UP (ref 0.5–1.3)

## 2022-12-12 PROCEDURE — 33286 RMVL SUBQ CAR RHYTHM MNTR: CPT

## 2022-12-12 PROCEDURE — 93010 ELECTROCARDIOGRAM REPORT: CPT

## 2022-12-12 PROCEDURE — 33274 TCAT INSJ/RPL PERM LDLS PM: CPT | Mod: Q0

## 2022-12-12 RX ORDER — INSULIN LISPRO 100/ML
VIAL (ML) SUBCUTANEOUS ONCE
Refills: 0 | Status: DISCONTINUED | OUTPATIENT
Start: 2022-12-12 | End: 2022-12-13

## 2022-12-12 RX ORDER — GLIMEPIRIDE 1 MG
1 TABLET ORAL
Qty: 0 | Refills: 0 | DISCHARGE

## 2022-12-12 RX ORDER — METOPROLOL TARTRATE 50 MG
25 TABLET ORAL DAILY
Refills: 0 | Status: DISCONTINUED | OUTPATIENT
Start: 2022-12-13 | End: 2022-12-13

## 2022-12-12 RX ORDER — METFORMIN HYDROCHLORIDE 850 MG/1
1 TABLET ORAL
Qty: 0 | Refills: 0 | DISCHARGE

## 2022-12-12 RX ORDER — CANAGLIFLOZIN 100 MG/1
1 TABLET, FILM COATED ORAL
Qty: 0 | Refills: 0 | DISCHARGE

## 2022-12-12 RX ORDER — AMLODIPINE BESYLATE 2.5 MG/1
5 TABLET ORAL DAILY
Refills: 0 | Status: DISCONTINUED | OUTPATIENT
Start: 2022-12-13 | End: 2022-12-13

## 2022-12-12 RX ORDER — QUINAPRIL HYDROCHLORIDE 40 MG/1
1 TABLET, FILM COATED ORAL
Qty: 0 | Refills: 0 | DISCHARGE

## 2022-12-12 RX ORDER — DEXTROSE 50 % IN WATER 50 %
25 SYRINGE (ML) INTRAVENOUS ONCE
Refills: 0 | Status: DISCONTINUED | OUTPATIENT
Start: 2022-12-12 | End: 2022-12-13

## 2022-12-12 RX ORDER — SODIUM CHLORIDE 9 MG/ML
1000 INJECTION, SOLUTION INTRAVENOUS
Refills: 0 | Status: DISCONTINUED | OUTPATIENT
Start: 2022-12-12 | End: 2022-12-13

## 2022-12-12 RX ORDER — SIMVASTATIN 20 MG/1
1 TABLET, FILM COATED ORAL
Qty: 0 | Refills: 0 | DISCHARGE

## 2022-12-12 RX ORDER — DEXTROSE 50 % IN WATER 50 %
15 SYRINGE (ML) INTRAVENOUS ONCE
Refills: 0 | Status: DISCONTINUED | OUTPATIENT
Start: 2022-12-12 | End: 2022-12-13

## 2022-12-12 RX ORDER — GLUCAGON INJECTION, SOLUTION 0.5 MG/.1ML
1 INJECTION, SOLUTION SUBCUTANEOUS ONCE
Refills: 0 | Status: DISCONTINUED | OUTPATIENT
Start: 2022-12-12 | End: 2022-12-13

## 2022-12-12 RX ORDER — LEVOTHYROXINE SODIUM 125 MCG
100 TABLET ORAL DAILY
Refills: 0 | Status: DISCONTINUED | OUTPATIENT
Start: 2022-12-13 | End: 2022-12-13

## 2022-12-12 NOTE — PATIENT PROFILE ADULT - FALL HARM RISK - HARM RISK INTERVENTIONS
Assistance with ambulation/Assistance OOB with selected safe patient handling equipment/Communicate Risk of Fall with Harm to all staff/Discuss with provider need for PT consult/Monitor gait and stability/Provide patient with walking aids - walker, cane, crutches/Reinforce activity limits and safety measures with patient and family/Sit up slowly, dangle for a short time, stand at bedside before walking/Tailored Fall Risk Interventions/Use of alarms - bed, chair and/or voice tab/Visual Cue: Yellow wristband and red socks/Bed in lowest position, wheels locked, appropriate side rails in place/Call bell, personal items and telephone in reach/Instruct patient to call for assistance before getting out of bed or chair/Non-slip footwear when patient is out of bed/[place] to call system/Physically safe environment - no spills, clutter or unnecessary equipment/Purposeful Proactive Rounding/Room/bathroom lighting operational, light cord in reach

## 2022-12-12 NOTE — CHART NOTE - NSCHARTNOTEFT_GEN_A_CORE
AL FIELDS  [id number]    PROCEDURE:  Leadless PPM implant    INDICATION:  Intermittent complete heart block    ELECTROPHYSIOLOGIST(S):  MD Chetan Nair MD (fellow)    ANESTHESIOLOGY:  MAC    FINDINGS:  Successful implantation of leadless PPM (micra)  TTE showed no effusion after case    COMPLICATIONS:        RECOMMENDATIONS:  Remove groin stitch in 6 hours  Monitor overnight  Bedrest 6 hours

## 2022-12-12 NOTE — H&P ADULT - HISTORY OF PRESENT ILLNESS
HPI:    77 year old F with DM, HTN, HLD, hypothyroid, history of syncope s/p ILR with a 14 second episode of CHB here today for leadless PPM implant.     Patient is here with her HCP Jessica pierre since mn.     PAST MEDICAL & SURGICAL HISTORY  Diabetes mellitus  Hypertension  Hypothyroidism    Social History: no smoking, no drugs, no etoh    pertinent home medications:  see below    Allergies: No Known Allergies    ROS:   CONSTITUTIONAL: No fever, weight loss + fatigue  EYES: Pt denies  RESPIRATORY: No cough, wheezing, chills or hemoptysis; No Shortness of Breath  CARDIOVASCULAR: see HPI  GASTROINTESTINAL: Pt denies  NEUROLOGICAL: Pt denies  SKIN: Pt denies   PSYCHIATRIC: Pt denies  HEME/LYMPH: Pt denies    PHYSICAL:  Appearance: No acute distress, well developed  Eyes: normal appearing conjunctiva, pupils and eyelids  Cardiovascular: Normal S1 S2, No JVD, No murmurs, No edema  Respiratory: Lungs clear to auscultation	bilaterally.  No wheeze, rhonchi, rales noted  Gastrointestinal:  Soft, NT/ND 	  Neurologic:  No deficit noted  Psych: A&Ox3, normal mood/affect  Skin: no rash noted, normal color and pigmentation.        LABS:  istat labs reviewed    Assessment Plan:    77 year old F with DM, HTN, HLD, hypothyroid, history of syncope s/p ILR with a 14 second episode of CHB here today for leadless PPM implant.

## 2022-12-13 ENCOUNTER — TRANSCRIPTION ENCOUNTER (OUTPATIENT)
Age: 77
End: 2022-12-13

## 2022-12-13 VITALS
HEART RATE: 71 BPM | DIASTOLIC BLOOD PRESSURE: 54 MMHG | SYSTOLIC BLOOD PRESSURE: 112 MMHG | OXYGEN SATURATION: 96 % | RESPIRATION RATE: 16 BRPM

## 2022-12-13 LAB
A1C WITH ESTIMATED AVERAGE GLUCOSE RESULT: 6.5 % — HIGH (ref 4–5.6)
ESTIMATED AVERAGE GLUCOSE: 140 MG/DL — HIGH (ref 68–114)

## 2022-12-13 PROCEDURE — 93005 ELECTROCARDIOGRAM TRACING: CPT

## 2022-12-13 PROCEDURE — 85610 PROTHROMBIN TIME: CPT

## 2022-12-13 PROCEDURE — 36415 COLL VENOUS BLD VENIPUNCTURE: CPT

## 2022-12-13 PROCEDURE — 82565 ASSAY OF CREATININE: CPT

## 2022-12-13 PROCEDURE — C1786: CPT

## 2022-12-13 PROCEDURE — C1894: CPT

## 2022-12-13 PROCEDURE — C1769: CPT

## 2022-12-13 PROCEDURE — 82330 ASSAY OF CALCIUM: CPT

## 2022-12-13 PROCEDURE — 84295 ASSAY OF SERUM SODIUM: CPT

## 2022-12-13 PROCEDURE — 82947 ASSAY GLUCOSE BLOOD QUANT: CPT

## 2022-12-13 PROCEDURE — 71046 X-RAY EXAM CHEST 2 VIEWS: CPT | Mod: 26

## 2022-12-13 PROCEDURE — 85014 HEMATOCRIT: CPT

## 2022-12-13 PROCEDURE — 82803 BLOOD GASES ANY COMBINATION: CPT

## 2022-12-13 PROCEDURE — 83036 HEMOGLOBIN GLYCOSYLATED A1C: CPT

## 2022-12-13 PROCEDURE — 82962 GLUCOSE BLOOD TEST: CPT

## 2022-12-13 PROCEDURE — 84132 ASSAY OF SERUM POTASSIUM: CPT

## 2022-12-13 PROCEDURE — 33274 TCAT INSJ/RPL PERM LDLS PM: CPT

## 2022-12-13 PROCEDURE — 33286 RMVL SUBQ CAR RHYTHM MNTR: CPT

## 2022-12-13 PROCEDURE — 71046 X-RAY EXAM CHEST 2 VIEWS: CPT

## 2022-12-13 RX ORDER — LEVOTHYROXINE SODIUM 125 MCG
1 TABLET ORAL
Qty: 30 | Refills: 0
Start: 2022-12-13 | End: 2023-01-11

## 2022-12-13 RX ORDER — AMLODIPINE BESYLATE 2.5 MG/1
1 TABLET ORAL
Qty: 30 | Refills: 0
Start: 2022-12-13 | End: 2023-01-11

## 2022-12-13 RX ORDER — METOPROLOL TARTRATE 50 MG
1 TABLET ORAL
Qty: 30 | Refills: 3
Start: 2022-12-13 | End: 2023-04-11

## 2022-12-13 RX ADMIN — Medication 25 MILLIGRAM(S): at 06:52

## 2022-12-13 RX ADMIN — AMLODIPINE BESYLATE 5 MILLIGRAM(S): 2.5 TABLET ORAL at 06:53

## 2022-12-13 RX ADMIN — Medication 100 MICROGRAM(S): at 06:53

## 2022-12-13 NOTE — PROCEDURE
[de-identified] : Yoono REVEAL LINQ II\par Battery good\par sensing good\par 14 seconds of complete heart block at 1:30 PM a month ago

## 2022-12-13 NOTE — DISCHARGE NOTE NURSING/CASE MANAGEMENT/SOCIAL WORK - PATIENT PORTAL LINK FT
You can access the FollowMyHealth Patient Portal offered by [facility] by registering at the following website: [email or web address]. By joining [email or web address]’s FollowMyHealth portal, you will also be able to view your health information using other applications (apps) compatible with our system.

## 2022-12-13 NOTE — DISCHARGE NOTE PROVIDER - NSDCCPCAREPLAN_GEN_ALL_CORE_FT
PRINCIPAL DISCHARGE DIAGNOSIS  Diagnosis: Cardiac pacemaker in situ  Assessment and Plan of Treatment:       SECONDARY DISCHARGE DIAGNOSES  Diagnosis: DM2 (diabetes mellitus, type 2)  Assessment and Plan of Treatment:     Diagnosis: HTN (hypertension)  Assessment and Plan of Treatment:     Diagnosis: HLD (hyperlipidemia)  Assessment and Plan of Treatment:     Diagnosis: Hypothyroidism  Assessment and Plan of Treatment:

## 2022-12-13 NOTE — ADDENDUM
[FreeTextEntry1] : I, Luther Marcelino, am scribing for and the presence of Dr. Campbell the following sections: HPI, PMH,Family/social history, ROS, Physical Exam, Assessment / Plan.\par  IArmani, personally performed the services described in the documentation, reviewed the documentation recorded by the scribe in my presence and it accurately and completely records my words and actions.\par

## 2022-12-13 NOTE — DISCHARGE NOTE PROVIDER - NSDCFUSCHEDAPPT_GEN_ALL_CORE_FT
[facility]  [address] [address]  Scheduled Appointment: 01/11/2023

## 2022-12-13 NOTE — DISCHARGE NOTE PROVIDER - HOSPITAL COURSE
Subjective:    pt is s/p successful MICRA implant + ILR removal   vitals stable overnight  telemetry shows  patient feeling     denies chest pain, palpitations, dizziness, syncope, nausea, vomiting, diarrhea, groin pain, facial droop or extremity weakness.     Inpatient Medications:   amLODIPine   Tablet 5 milliGRAM(s) Oral daily  glucagon  Injectable 1 milliGRAM(s) IntraMuscular Once  insulin lispro (ADMELOG) corrective regimen sliding scale   SubCutaneous Once  levothyroxine 100 MICROGram(s) Oral daily  metoprolol succinate ER 25 milliGRAM(s) Oral daily      Allergies: No Known Allergies      ROS:   CONSTITUTIONAL: No fever, weight loss + fatigue  EYES: Pt denies  RESPIRATORY: No cough, wheezing, chills or hemoptysis; No Shortness of Breath  CARDIOVASCULAR: see HPI  GASTROINTESTINAL: Pt denies  NEUROLOGICAL: Pt denies  SKIN: Pt denies   PSYCHIATRIC: Pt denies  HEME/LYMPH: Pt denies    PHYSICAL:  T(C): 36.5 (12-13-22 @ 09:12), Max: 36.9 (12-13-22 @ 06:56)  HR: 69 (12-13-22 @ 08:34) (69 - 75)  BP: 124/56 (12-13-22 @ 08:34) (105/54 - 152/67)  RR: 16 (12-13-22 @ 08:34) (16 - 18)  SpO2: 97% (12-13-22 @ 08:34) (95% - 99%)  Appearance: No acute distress, well developed  Eyes: normal appearing conjunctiva, pupils and eyelids  Cardiovascular: Normal S1 S2, No JVD, No murmurs, No edema  Respiratory: Lungs clear to auscultation	bilaterally.  No wheeze, rhonchi, rales noted  Gastrointestinal:  Soft, NT/ND 	  Neurologic:  No deficit noted  Psych: A&Ox3, normal mood/affect  Extremities: Groin exam:    Assessment Plan:  pt is s/p successful micra implant and ILR removal 12/12       Subjective:    pt is s/p successful MICRA implant + ILR removal   vitals stable overnight  telemetry shows sinus rhythm  patient feeling well no complaints. Pts home care nurse Isis is at bedside.    denies chest pain, palpitations, dizziness, syncope, nausea, vomiting, diarrhea, groin pain, facial droop or extremity weakness.     Inpatient Medications:   amLODIPine   Tablet 5 milliGRAM(s) Oral daily  glucagon  Injectable 1 milliGRAM(s) IntraMuscular Once  insulin lispro (ADMELOG) corrective regimen sliding scale   SubCutaneous Once  levothyroxine 100 MICROGram(s) Oral daily  metoprolol succinate ER 25 milliGRAM(s) Oral daily      Allergies: No Known Allergies      ROS:   CONSTITUTIONAL: No fever, weight loss + fatigue  EYES: Pt denies  RESPIRATORY: No cough, wheezing, chills or hemoptysis; No Shortness of Breath  CARDIOVASCULAR: see HPI  GASTROINTESTINAL: Pt denies  NEUROLOGICAL: Pt denies  SKIN: Pt denies   PSYCHIATRIC: Pt denies  HEME/LYMPH: Pt denies    PHYSICAL:  T(C): 36.5 (12-13-22 @ 09:12), Max: 36.9 (12-13-22 @ 06:56)  HR: 69 (12-13-22 @ 08:34) (69 - 75)  BP: 124/56 (12-13-22 @ 08:34) (105/54 - 152/67)  RR: 16 (12-13-22 @ 08:34) (16 - 18)  SpO2: 97% (12-13-22 @ 08:34) (95% - 99%)  Appearance: No acute distress, well developed  Eyes: normal appearing conjunctiva, pupils and eyelids  Cardiovascular: Normal S1 S2, No JVD, No murmurs, No edema  Respiratory: Lungs clear to auscultation	bilaterally.  No wheeze, rhonchi, rales noted  Gastrointestinal:  Soft, NT/ND 	  Neurologic:  No deficit noted  Psych: A&Ox3, normal mood/affect  Extremities: Groin exam: R groin soft nontender no bleeding or hematoma.     Assessment Plan:  pt is s/p successful micra implant and ILR removal 12/12  -plan for dc home today  -no rx changes. will send 1 month of refills for her meds per Isis's request  -post-procedure instructions given to the patient       Subjective:    pt is s/p successful MICRA implant + ILR removal   vitals stable overnight  telemetry shows sinus rhythm  patient feeling well no complaints. Pts home care nurse Isis is at bedside.    denies chest pain, palpitations, dizziness, syncope, nausea, vomiting, diarrhea, groin pain, facial droop or extremity weakness.     Inpatient Medications:   amLODIPine   Tablet 5 milliGRAM(s) Oral daily  glucagon  Injectable 1 milliGRAM(s) IntraMuscular Once  insulin lispro (ADMELOG) corrective regimen sliding scale   SubCutaneous Once  levothyroxine 100 MICROGram(s) Oral daily  metoprolol succinate ER 25 milliGRAM(s) Oral daily      Allergies: No Known Allergies      ROS:   CONSTITUTIONAL: No fever, weight loss + fatigue  EYES: Pt denies  RESPIRATORY: No cough, wheezing, chills or hemoptysis; No Shortness of Breath  CARDIOVASCULAR: see HPI  GASTROINTESTINAL: Pt denies  NEUROLOGICAL: Pt denies  SKIN: Pt denies   PSYCHIATRIC: Pt denies  HEME/LYMPH: Pt denies    PHYSICAL:  T(C): 36.5 (12-13-22 @ 09:12), Max: 36.9 (12-13-22 @ 06:56)  HR: 69 (12-13-22 @ 08:34) (69 - 75)  BP: 124/56 (12-13-22 @ 08:34) (105/54 - 152/67)  RR: 16 (12-13-22 @ 08:34) (16 - 18)  SpO2: 97% (12-13-22 @ 08:34) (95% - 99%)  Appearance: No acute distress, well developed  Eyes: normal appearing conjunctiva, pupils and eyelids  Cardiovascular: Normal S1 S2, No JVD, No murmurs, No edema  Respiratory: Lungs clear to auscultation	bilaterally.  No wheeze, rhonchi, rales noted  Gastrointestinal:  Soft, NT/ND 	  Neurologic:  No deficit noted  Psych: A&Ox3, normal mood/affect  Extremities: Groin exam: R groin soft nontender no bleeding or hematoma.     Assessment Plan:  pt is s/p successful micra implant and ILR removal 12/12  -plan for dc home today  -no rx changes. will send 1 month of refills for her meds per Isis's request  -post-procedure instructions given to the patient  -pt a1c elevated to 6.5%, was previously on oral anti-hyperglycemics which were recently discontinued. FSG here have not been elevated- pt should follow up with her PCP for diabetes management.

## 2022-12-13 NOTE — DISCUSSION/SUMMARY
[FreeTextEntry1] : 77 y/o Female with NIDDM, HTN, HLD, hypothyroidism and syncope s/p ILR, who presents for follow up.  Device incision is well healed.  Interrogation reveals no events that correlate with fall.  However, she had a 14 second episode of complete heart block with clear p waves marching through and no QRS.  She was asymptomatic from this.  This was a month ago and no events since.  At this time I have recommended a pacemaker implant and ILR explant.  We discussed the rationale for this and it is to keep her safe from syncope / hurting herself.  She is amenable to this.   We discussed the procedure in detail including risks, benefits, and alternatives.  We discussed procedural risks--including but not limited to bleeding/pocket hematoma, phlebitis/thrombophlebitis, lead dislodgment, PPM and/or lead infection, device malfunction, implantation site discomfort, pneumothorax, hemothorax, pericardial bleed/tamponade, anaphylaxis, air embolism, infection, skin erosion, lead fracture, lead dislodgement, pectoral muscle stimulation, intercostal or diaphragmatic pacing, vascular thrombosis, endocarditis or need for emergent surgery..\par \par After consideration of this information, the decision was made to proceed with device implantation. Ms. Gallegos  appeared to understand the whole discussion and verbalized that all his questions were answered to his satisfaction.  She was given pre procedure instructions and knows to call with any questions or concerns.  \par \par

## 2022-12-13 NOTE — HISTORY OF PRESENT ILLNESS
[de-identified] : 78 y/o Female with NIDDM, HTN, HLD, hypothyroidism and syncope s/p ILR, who presents for follow up.\par \par She was admitted 5/2021 after waking up on the floor and unclear how she got there. She was treated for rhabdomyolysis. Diagnostic cardiac cath 5/20/21: nonobstructive CAD.  ECHO 5/18/21 hyperdynamic EF 75% with asymmetric LVH and no valvular disorders. Carotid US negative.  B/L LE dopplers negative. She underwent an ILR and presents for follow up.  No further syncope.  She has had "extreme weakness" and was found on the floor (she denies LOC but this was not witnessed) 9/2022.  She was admitted to rehab and did not transmit again until 12/5.  She had 14 seconds of complete heart block 11/8.  At the time she was in rehab with no tests, procedures or symptoms at the time.  Of note no events when she had her fall in September.  This was discussed with her health care proxy / nephew and given that the event was a month ago she came in today to discuss elective pacemaker placement.  She is doing well.  She is in a wheelchair when she leaves her house but does some walking around the house.   No chest pain, SOB, orthopnea or palpitations.  No device related issues.\par \par

## 2022-12-13 NOTE — PHYSICAL EXAM
[General Appearance - Well Developed] : well developed [Normal Appearance] : normal appearance [Well Groomed] : well groomed [General Appearance - Well Nourished] : well nourished [No Deformities] : no deformities [General Appearance - In No Acute Distress] : no acute distress [Heart Rate And Rhythm] : heart rate and rhythm were normal [Heart Sounds] : normal S1 and S2 [] : no respiratory distress [Respiration, Rhythm And Depth] : normal respiratory rhythm and effort [Exaggerated Use Of Accessory Muscles For Inspiration] : no accessory muscle use [Clean] : clean [Dry] : dry [Well-Healed] : well-healed [Palpable Crepitus] : no palpable crepitus [Bleeding] : no active bleeding [Foul Odor] : no foul smell [Purulent Drainage] : no purulent drainage [Serous Drainage] : no serous drainage [Erythema] : not erythematous [Warm] : not warm [Tender] : not tender [Indurated] : not indurated [Fluctuant] : not fluctuant

## 2022-12-13 NOTE — DISCHARGE NOTE PROVIDER - CARE PROVIDER_API CALL
Armani Campbell)  Cardiac Electrophysiology  [address], [address] [place] [address]  Phone: [phone]  Fax: [phone]  Established Patient  Follow Up Time: 1 month

## 2022-12-13 NOTE — REVIEW OF SYSTEMS
[Fever] : no fever [Weight Gain (___ Lbs)] : no recent weight gain [Chills] : no chills [Feeling Fatigued] : not feeling fatigued [Weight Loss (___ Lbs)] : no recent weight loss [SOB] : no shortness of breath [Palpitations] : no palpitations [Orthopnea] : no orthopnea [Syncope] : no syncope [Cough] : no cough [Rash] : no rash [Dizziness] : no dizziness [Under Stress] : not under stress [Easy Bleeding] : no tendency for easy bleeding

## 2022-12-13 NOTE — DISCHARGE NOTE PROVIDER - NSDCMRMEDTOKEN_GEN_ALL_CORE_FT
amLODIPine 5 mg oral tablet: 1 tab(s) orally once a day  levothyroxine 100 mcg (0.1 mg) oral tablet: 1 tab(s) orally once a day  metoprolol succinate 25 mg oral tablet, extended release: 1 tab(s) orally once a day

## 2022-12-21 DIAGNOSIS — I10 ESSENTIAL (PRIMARY) HYPERTENSION: ICD-10-CM

## 2022-12-21 DIAGNOSIS — I44.2 ATRIOVENTRICULAR BLOCK, COMPLETE: ICD-10-CM

## 2022-12-21 DIAGNOSIS — E78.5 HYPERLIPIDEMIA, UNSPECIFIED: ICD-10-CM

## 2022-12-21 DIAGNOSIS — Z79.890 HORMONE REPLACEMENT THERAPY: ICD-10-CM

## 2022-12-21 DIAGNOSIS — E03.9 HYPOTHYROIDISM, UNSPECIFIED: ICD-10-CM

## 2022-12-21 DIAGNOSIS — Z95.811 PRESENCE OF HEART ASSIST DEVICE: ICD-10-CM

## 2022-12-21 DIAGNOSIS — E11.9 TYPE 2 DIABETES MELLITUS WITHOUT COMPLICATIONS: ICD-10-CM

## 2023-01-12 ENCOUNTER — APPOINTMENT (OUTPATIENT)
Dept: HEART AND VASCULAR | Facility: CLINIC | Age: 78
End: 2023-01-12
Payer: MEDICARE

## 2023-01-12 VITALS
HEART RATE: 88 BPM | DIASTOLIC BLOOD PRESSURE: 75 MMHG | WEIGHT: 176 LBS | HEIGHT: 60 IN | BODY MASS INDEX: 34.55 KG/M2 | SYSTOLIC BLOOD PRESSURE: 123 MMHG

## 2023-01-12 PROCEDURE — 93279 PRGRMG DEV EVAL PM/LDLS PM: CPT

## 2023-01-12 NOTE — PROCEDURE
[No] : not [NSR] : normal sinus rhythm [Pacemaker] : pacemaker [VVI] : VVI [Lead Imp:  ___ohms] : lead impedance was [unfilled] ohms [Sensing Amplitude ___mv] : sensing amplitude was [unfilled] mv [___V @] : [unfilled] V [___ ms] : [unfilled] ms [None] : none [Pace ___ %] : Pace [unfilled]% [de-identified] : medtronic [de-identified] : sudha [de-identified] : 2023 [de-identified] : 59

## 2023-01-12 NOTE — REVIEW OF SYSTEMS
[Fever] : no fever [Weight Gain (___ Lbs)] : no recent weight gain [Chills] : no chills [Feeling Fatigued] : not feeling fatigued [Weight Loss (___ Lbs)] : no recent weight loss [SOB] : no shortness of breath [Chest Discomfort] : no chest discomfort [Palpitations] : no palpitations [Orthopnea] : no orthopnea [Syncope] : no syncope [Cough] : no cough [Rash] : no rash [Dizziness] : no dizziness [Under Stress] : not under stress [Easy Bleeding] : no tendency for easy bleeding

## 2023-01-12 NOTE — HISTORY OF PRESENT ILLNESS
[de-identified] : 78 y/o Female with NIDDM, HTN, HLD, hypothyroidism and syncope s/p ILR with heart block now s/p MICRA, who presents for follow up.\par \par She was admitted 5/2021 after waking up on the floor and unclear how she got there. She was treated for rhabdomyolysis. Diagnostic cardiac cath 5/20/21: nonobstructive CAD.  ECHO 5/18/21 hyperdynamic EF 75% with asymmetric LVH and no valvular disorders. Carotid US negative.  B/L LE dopplers negative. She underwent an ILR and presents for follow up.  No further syncope.  She has had "extreme weakness" and was found on the floor (she denies LOC but this was not witnessed) 9/2022.  She was admitted to rehab and did not transmit again until 12/5.  She had 14 seconds of complete heart block 11/8.  At the time she was in rehab with no tests, procedures or symptoms at the time.  Of note no events when she had her fall in September.  \par She is now s/p MICRA placement.  She has some more energy.  No syncope, near syncope, chest pain, palpitations or SOB.  No issues at procedure site / groin

## 2023-01-12 NOTE — DISCUSSION/SUMMARY
[FreeTextEntry1] : 78 y/o Female with NIDDM, HTN, HLD, hypothyroidism and syncope s/p ILR with heart block now s/p MICRA, who presents for follow up.  Interrogation reveals normal function and all measured data is within normal limits.  No events for review.  We reviewed home monitoring and as long as she uses this she will follow up in 1 year or sooner if needed.  She knows to call with any questions or concerns.  \par \par

## 2023-01-16 ENCOUNTER — FORM ENCOUNTER (OUTPATIENT)
Age: 78
End: 2023-01-16

## 2023-04-04 NOTE — DISCHARGE NOTE PROVIDER - CARE PROVIDERS DIRECT ADDRESSES
Left message on machine to return call.   ,DirectAddress_Unknown ,DirectAddress_Unknown,[email or web address] ,DirectAddress_Unknown,[email or web address]

## 2023-04-10 ENCOUNTER — NON-APPOINTMENT (OUTPATIENT)
Age: 78
End: 2023-04-10

## 2023-04-10 ENCOUNTER — APPOINTMENT (OUTPATIENT)
Dept: HEART AND VASCULAR | Facility: CLINIC | Age: 78
End: 2023-04-10
Payer: MEDICARE

## 2023-04-10 PROCEDURE — 93296 REM INTERROG EVL PM/IDS: CPT

## 2023-04-10 PROCEDURE — 93294 REM INTERROG EVL PM/LDLS PM: CPT

## 2023-04-10 NOTE — ED ADULT NURSE NOTE - PAIN RATING/NUMBER SCALE (0-10): ACTIVITY
XR of left foot ordered.   We discussed use of compression socks during daytime hours.   Advised to elevate foot when sitting for long periods of time.   
0

## 2023-04-12 ENCOUNTER — FORM ENCOUNTER (OUTPATIENT)
Age: 78
End: 2023-04-12

## 2023-06-26 NOTE — ED ADULT TRIAGE NOTE - MEANS OF ARRIVAL
Dear Jc Lei    Thank you for choosing [facility] ([facility]) for your infusion.  The following information is a summary of our appointment as well as important reminders.      EDUCATION POST BIOLOGICAL/CHEMOTHERAPY INFUSION  Call the triage nurse at your clinic or seek medical attention if you have chills and/or temperature greater than or equal to 100.5, uncontrolled nausea/vomiting, diarrhea, constipation, dizziness, shortness of breath, chest pain, heart palpitations, weakness or any other new or concerning symptoms, questions or concerns.  You can not have any live virus vaccines prior to or during treatment or up to 6 months post infusion.  If you have an upcoming surgery, medical procedure or dental procedure during treatment, this should be discussed with your ordering physician and your surgeon/dentist.  If you are having any concerning symptom, if you are unsure if you should get your next infusion or wish to speak to a provider before your next infusion, please call your care coordinator or triage nurse at your clinic to notify them so we can adequately serve you.     We look forward in seeing you on your next appointment here at [facility] ([facility]).  Please don t hesitate to call us at [phone] to reschedule any of your appointments or to speak with one of the [facility] registered nurses.  It was a pleasure taking care of you today.    Sincerely,    [facility]  [address] [place] [address]  [id number]  Phone:  [phone]   
stretcher

## 2023-07-10 ENCOUNTER — FORM ENCOUNTER (OUTPATIENT)
Age: 78
End: 2023-07-10

## 2023-07-10 ENCOUNTER — APPOINTMENT (OUTPATIENT)
Dept: HEART AND VASCULAR | Facility: CLINIC | Age: 78
End: 2023-07-10

## 2023-07-17 ENCOUNTER — APPOINTMENT (OUTPATIENT)
Dept: HEART AND VASCULAR | Facility: CLINIC | Age: 78
End: 2023-07-17
Payer: MEDICARE

## 2023-07-17 ENCOUNTER — NON-APPOINTMENT (OUTPATIENT)
Age: 78
End: 2023-07-17

## 2023-07-17 PROCEDURE — 93296 REM INTERROG EVL PM/IDS: CPT

## 2023-07-17 PROCEDURE — 93294 REM INTERROG EVL PM/LDLS PM: CPT

## 2023-10-16 ENCOUNTER — NON-APPOINTMENT (OUTPATIENT)
Age: 78
End: 2023-10-16

## 2023-10-16 ENCOUNTER — APPOINTMENT (OUTPATIENT)
Dept: HEART AND VASCULAR | Facility: CLINIC | Age: 78
End: 2023-10-16
Payer: MEDICARE

## 2023-10-16 PROCEDURE — 93296 REM INTERROG EVL PM/IDS: CPT

## 2023-10-16 PROCEDURE — 93294 REM INTERROG EVL PM/LDLS PM: CPT

## 2024-01-16 ENCOUNTER — NON-APPOINTMENT (OUTPATIENT)
Age: 79
End: 2024-01-16

## 2024-01-16 ENCOUNTER — APPOINTMENT (OUTPATIENT)
Dept: HEART AND VASCULAR | Facility: CLINIC | Age: 79
End: 2024-01-16
Payer: MEDICARE

## 2024-01-16 PROCEDURE — 93296 REM INTERROG EVL PM/IDS: CPT

## 2024-01-16 PROCEDURE — 93294 REM INTERROG EVL PM/LDLS PM: CPT

## 2024-04-16 ENCOUNTER — NON-APPOINTMENT (OUTPATIENT)
Age: 79
End: 2024-04-16

## 2024-04-16 ENCOUNTER — APPOINTMENT (OUTPATIENT)
Dept: HEART AND VASCULAR | Facility: CLINIC | Age: 79
End: 2024-04-16
Payer: MEDICARE

## 2024-04-16 PROCEDURE — 93296 REM INTERROG EVL PM/IDS: CPT

## 2024-04-16 PROCEDURE — 93294 REM INTERROG EVL PM/LDLS PM: CPT

## 2024-07-19 ENCOUNTER — APPOINTMENT (OUTPATIENT)
Dept: HEART AND VASCULAR | Facility: CLINIC | Age: 79
End: 2024-07-19

## 2024-07-26 ENCOUNTER — APPOINTMENT (OUTPATIENT)
Dept: HEART AND VASCULAR | Facility: CLINIC | Age: 79
End: 2024-07-26

## 2024-07-26 PROCEDURE — 93294 REM INTERROG EVL PM/LDLS PM: CPT

## 2024-07-26 PROCEDURE — 93296 REM INTERROG EVL PM/IDS: CPT

## 2024-08-26 NOTE — DISCHARGE NOTE PROVIDER - NSDCACTIVITY_GEN_ALL_CORE
<--- Click to Launch ICDx for PreOp, PostOp and Procedure
<--- Click to Launch ICDx for PreOp, PostOp and Procedure
Showering allowed/No heavy lifting/straining

## 2024-10-25 ENCOUNTER — APPOINTMENT (OUTPATIENT)
Dept: HEART AND VASCULAR | Facility: CLINIC | Age: 79
End: 2024-10-25

## 2024-10-25 PROCEDURE — 93294 REM INTERROG EVL PM/LDLS PM: CPT

## 2024-10-25 PROCEDURE — 93296 REM INTERROG EVL PM/IDS: CPT

## 2025-01-27 ENCOUNTER — APPOINTMENT (OUTPATIENT)
Dept: HEART AND VASCULAR | Facility: CLINIC | Age: 80
End: 2025-01-27
Payer: MEDICARE

## 2025-01-27 ENCOUNTER — NON-APPOINTMENT (OUTPATIENT)
Age: 80
End: 2025-01-27

## 2025-01-27 PROCEDURE — 93296 REM INTERROG EVL PM/IDS: CPT

## 2025-01-27 PROCEDURE — 93294 REM INTERROG EVL PM/LDLS PM: CPT

## 2025-04-28 ENCOUNTER — APPOINTMENT (OUTPATIENT)
Dept: HEART AND VASCULAR | Facility: CLINIC | Age: 80
End: 2025-04-28
Payer: MEDICARE

## 2025-04-28 ENCOUNTER — NON-APPOINTMENT (OUTPATIENT)
Age: 80
End: 2025-04-28

## 2025-04-28 PROCEDURE — 93294 REM INTERROG EVL PM/LDLS PM: CPT

## 2025-04-28 PROCEDURE — 93296 REM INTERROG EVL PM/IDS: CPT

## 2025-07-17 NOTE — ED ADULT NURSE NOTE - NSICDXPASTMEDICALHX_GEN_ALL_CORE_FT
Pt in exam room and reports that she had red [place] on her leg for a few days after the last month's injections.   Pt tolerating them otherwise.   Injections admin to abdomen today. Pt tolerated well and monitored for some minutes afterwards, no c/o  F/u in one month. This was dose 4 of 12 for patient.   
PAST MEDICAL HISTORY:  Diabetes mellitus     Hypertension     Hypothyroidism

## 2025-07-28 ENCOUNTER — APPOINTMENT (OUTPATIENT)
Dept: HEART AND VASCULAR | Facility: CLINIC | Age: 80
End: 2025-07-28

## 2025-08-28 ENCOUNTER — APPOINTMENT (OUTPATIENT)
Dept: HEART AND VASCULAR | Facility: CLINIC | Age: 80
End: 2025-08-28

## 2025-09-03 ENCOUNTER — APPOINTMENT (OUTPATIENT)
Dept: HEART AND VASCULAR | Facility: CLINIC | Age: 80
End: 2025-09-03